# Patient Record
Sex: FEMALE | Race: WHITE | NOT HISPANIC OR LATINO | Employment: OTHER | URBAN - METROPOLITAN AREA
[De-identification: names, ages, dates, MRNs, and addresses within clinical notes are randomized per-mention and may not be internally consistent; named-entity substitution may affect disease eponyms.]

---

## 2018-11-05 ENCOUNTER — HISTORICAL (OUTPATIENT)
Dept: ADMINISTRATIVE | Facility: HOSPITAL | Age: 83
End: 2018-11-05

## 2018-11-07 LAB — FINAL CULTURE: NORMAL

## 2021-09-22 ENCOUNTER — HISTORICAL (OUTPATIENT)
Dept: ADMINISTRATIVE | Facility: HOSPITAL | Age: 86
End: 2021-09-22

## 2021-09-22 LAB
ABS NEUT (OLG): 3.15 X10(3)/MCL (ref 2.1–9.2)
ALBUMIN SERPL-MCNC: 3.8 GM/DL (ref 3.4–4.8)
ALBUMIN/GLOB SERPL: 1.5 RATIO (ref 1.1–2)
ALP SERPL-CCNC: 87 UNIT/L (ref 40–150)
ALT SERPL-CCNC: 10 UNIT/L (ref 0–55)
AST SERPL-CCNC: 22 UNIT/L (ref 5–34)
BASOPHILS # BLD AUTO: 0.1 X10(3)/MCL (ref 0–0.2)
BASOPHILS NFR BLD AUTO: 1 %
BILIRUB SERPL-MCNC: 0.9 MG/DL
BILIRUBIN DIRECT+TOT PNL SERPL-MCNC: 0.4 MG/DL (ref 0–0.5)
BILIRUBIN DIRECT+TOT PNL SERPL-MCNC: 0.5 MG/DL (ref 0–0.8)
BUN SERPL-MCNC: 23.2 MG/DL (ref 9.8–20.1)
CALCIUM SERPL-MCNC: 9.9 MG/DL (ref 8.4–10.2)
CHLORIDE SERPL-SCNC: 101 MMOL/L (ref 98–111)
CO2 SERPL-SCNC: 30 MMOL/L (ref 23–31)
CREAT SERPL-MCNC: 1.11 MG/DL (ref 0.55–1.02)
EOSINOPHIL # BLD AUTO: 0.2 X10(3)/MCL (ref 0–0.9)
EOSINOPHIL NFR BLD AUTO: 4 %
ERYTHROCYTE [DISTWIDTH] IN BLOOD BY AUTOMATED COUNT: 12.4 % (ref 11.5–17)
GLOBULIN SER-MCNC: 2.6 GM/DL (ref 2.4–3.5)
GLUCOSE SERPL-MCNC: 99 MG/DL (ref 75–121)
HCT VFR BLD AUTO: 45.1 % (ref 37–47)
HGB BLD-MCNC: 14.7 GM/DL (ref 12–16)
LYMPHOCYTES # BLD AUTO: 1.9 X10(3)/MCL (ref 0.6–4.6)
LYMPHOCYTES NFR BLD AUTO: 32 %
MCH RBC QN AUTO: 32.7 PG (ref 27–31)
MCHC RBC AUTO-ENTMCNC: 32.6 GM/DL (ref 33–36)
MCV RBC AUTO: 100.4 FL (ref 80–94)
MONOCYTES # BLD AUTO: 0.7 X10(3)/MCL (ref 0.1–1.3)
MONOCYTES NFR BLD AUTO: 11 %
NEUTROPHILS # BLD AUTO: 3.15 X10(3)/MCL (ref 2.1–9.2)
NEUTROPHILS NFR BLD AUTO: 52 %
PLATELET # BLD AUTO: 176 X10(3)/MCL (ref 130–400)
PMV BLD AUTO: 10 FL (ref 9.4–12.4)
POTASSIUM SERPL-SCNC: 4.1 MMOL/L (ref 3.5–5.1)
PROT SERPL-MCNC: 6.4 GM/DL (ref 5.8–7.6)
RBC # BLD AUTO: 4.49 X10(6)/MCL (ref 4.2–5.4)
SODIUM SERPL-SCNC: 141 MMOL/L (ref 132–146)
T4 FREE SERPL-MCNC: 1.11 NG/DL (ref 0.7–1.48)
TSH SERPL-ACNC: 1.24 UIU/ML (ref 0.35–4.94)
WBC # SPEC AUTO: 6.1 X10(3)/MCL (ref 4.5–11.5)

## 2022-03-09 ENCOUNTER — HISTORICAL (OUTPATIENT)
Dept: ADMINISTRATIVE | Facility: HOSPITAL | Age: 87
End: 2022-03-09

## 2022-03-09 LAB
ABS NEUT (OLG): 4.16 (ref 2.1–9.2)
ALBUMIN SERPL-MCNC: 3.8 G/DL (ref 3.4–4.8)
ALBUMIN/GLOB SERPL: 1.4 {RATIO} (ref 1.1–2)
ALP SERPL-CCNC: 87 U/L (ref 40–150)
ALT SERPL-CCNC: 14 U/L (ref 0–55)
AST SERPL-CCNC: 25 U/L (ref 5–34)
BASOPHILS # BLD AUTO: 0.1 10*3/UL (ref 0–0.2)
BASOPHILS NFR BLD AUTO: 1 %
BILIRUB SERPL-MCNC: 1 MG/DL
BILIRUBIN DIRECT+TOT PNL SERPL-MCNC: 0.4 (ref 0–0.5)
BILIRUBIN DIRECT+TOT PNL SERPL-MCNC: 0.6 (ref 0–0.8)
BUN SERPL-MCNC: 23.2 MG/DL (ref 9.8–20.1)
CALCIUM SERPL-MCNC: 9.5 MG/DL (ref 8.7–10.5)
CHLORIDE SERPL-SCNC: 102 MMOL/L (ref 98–111)
CHOLEST SERPL-MCNC: 177 MG/DL
CHOLEST/HDLC SERPL: 3 {RATIO} (ref 0–5)
CO2 SERPL-SCNC: 30 MMOL/L (ref 23–31)
CREAT SERPL-MCNC: 1.03 MG/DL (ref 0.55–1.02)
EOSINOPHIL # BLD AUTO: 0.1 10*3/UL (ref 0–0.9)
EOSINOPHIL NFR BLD AUTO: 2 %
ERYTHROCYTE [DISTWIDTH] IN BLOOD BY AUTOMATED COUNT: 12.7 % (ref 11.5–17)
GLOBULIN SER-MCNC: 2.8 G/DL (ref 2.4–3.5)
GLUCOSE SERPL-MCNC: 99 MG/DL (ref 75–121)
HCT VFR BLD AUTO: 45.6 % (ref 37–47)
HDLC SERPL-MCNC: 69 MG/DL (ref 35–60)
HEMOLYSIS INTERF INDEX SERPL-ACNC: 19
HGB BLD-MCNC: 14.8 G/DL (ref 12–16)
ICTERIC INTERF INDEX SERPL-ACNC: 1
LDLC SERPL CALC-MCNC: 87 MG/DL (ref 50–140)
LIPEMIC INTERF INDEX SERPL-ACNC: 5
LYMPHOCYTES # BLD AUTO: 2 10*3/UL (ref 0.6–4.6)
LYMPHOCYTES NFR BLD AUTO: 29 %
MANUAL DIFF? (OHS): NO
MCH RBC QN AUTO: 32.9 PG (ref 27–31)
MCHC RBC AUTO-ENTMCNC: 32.5 G/DL (ref 33–36)
MCV RBC AUTO: 101.3 FL (ref 80–94)
MONOCYTES # BLD AUTO: 0.6 10*3/UL (ref 0.1–1.3)
MONOCYTES NFR BLD AUTO: 9 %
NEUTROPHILS # BLD AUTO: 4.16 10*3/UL (ref 2.1–9.2)
NEUTROPHILS NFR BLD AUTO: 59 %
PLATELET # BLD AUTO: 194 10*3/UL (ref 130–400)
PMV BLD AUTO: 10.2 FL (ref 9.4–12.4)
POTASSIUM SERPL-SCNC: 4.8 MMOL/L (ref 3.5–5.1)
PROT SERPL-MCNC: 6.6 G/DL (ref 5.8–7.6)
RBC # BLD AUTO: 4.5 10*6/UL (ref 4.2–5.4)
SODIUM SERPL-SCNC: 142 MMOL/L (ref 132–146)
TRIGL SERPL-MCNC: 105 MG/DL (ref 37–140)
TSH SERPL-ACNC: 1.18 M[IU]/L (ref 0.35–4.94)
VLDLC SERPL CALC-MCNC: 21 MG/DL
WBC # SPEC AUTO: 7 10*3/UL (ref 4.5–11.5)

## 2022-04-11 ENCOUNTER — HISTORICAL (OUTPATIENT)
Dept: ADMINISTRATIVE | Facility: HOSPITAL | Age: 87
End: 2022-04-11

## 2022-04-28 VITALS
SYSTOLIC BLOOD PRESSURE: 126 MMHG | HEIGHT: 62 IN | BODY MASS INDEX: 33.51 KG/M2 | WEIGHT: 182.13 LBS | DIASTOLIC BLOOD PRESSURE: 82 MMHG

## 2022-06-18 ENCOUNTER — OFFICE VISIT (OUTPATIENT)
Dept: URGENT CARE | Facility: CLINIC | Age: 87
End: 2022-06-18
Payer: MEDICARE

## 2022-06-18 VITALS
HEART RATE: 88 BPM | WEIGHT: 159 LBS | BODY MASS INDEX: 29.26 KG/M2 | HEIGHT: 62 IN | DIASTOLIC BLOOD PRESSURE: 87 MMHG | RESPIRATION RATE: 18 BRPM | TEMPERATURE: 99 F | SYSTOLIC BLOOD PRESSURE: 124 MMHG | OXYGEN SATURATION: 99 %

## 2022-06-18 DIAGNOSIS — H10.32 ACUTE CONJUNCTIVITIS OF LEFT EYE, UNSPECIFIED ACUTE CONJUNCTIVITIS TYPE: Primary | ICD-10-CM

## 2022-06-18 PROCEDURE — 99213 PR OFFICE/OUTPT VISIT, EST, LEVL III, 20-29 MIN: ICD-10-PCS | Mod: ,,, | Performed by: FAMILY MEDICINE

## 2022-06-18 PROCEDURE — 99213 OFFICE O/P EST LOW 20 MIN: CPT | Mod: ,,, | Performed by: FAMILY MEDICINE

## 2022-06-18 RX ORDER — ENALAPRIL MALEATE 10 MG/1
10 TABLET ORAL DAILY
COMMUNITY
Start: 2022-04-05 | End: 2022-06-23 | Stop reason: SDUPTHER

## 2022-06-18 RX ORDER — LEVOTHYROXINE SODIUM 75 UG/1
75 TABLET ORAL DAILY
COMMUNITY
Start: 2022-04-12 | End: 2022-06-23 | Stop reason: SDUPTHER

## 2022-06-18 RX ORDER — MULTIVITAMIN/IRON/FOLIC ACID 18MG-0.4MG
TABLET ORAL
COMMUNITY
Start: 2022-06-01 | End: 2023-01-10 | Stop reason: SDUPTHER

## 2022-06-18 RX ORDER — APIXABAN 5 MG/1
5 TABLET, FILM COATED ORAL 2 TIMES DAILY
COMMUNITY
Start: 2022-04-20 | End: 2022-06-23 | Stop reason: SDUPTHER

## 2022-06-18 RX ORDER — ATORVASTATIN CALCIUM 20 MG/1
20 TABLET, FILM COATED ORAL DAILY
COMMUNITY
Start: 2022-04-05 | End: 2022-06-23 | Stop reason: SDUPTHER

## 2022-06-18 RX ORDER — FUROSEMIDE 40 MG/1
40 TABLET ORAL DAILY
COMMUNITY
Start: 2022-04-05 | End: 2022-06-23 | Stop reason: SDUPTHER

## 2022-06-18 RX ORDER — GENTAMICIN SULFATE 3 MG/ML
1 SOLUTION/ DROPS OPHTHALMIC EVERY 4 HOURS
Qty: 5 ML | Refills: 0 | Status: SHIPPED | OUTPATIENT
Start: 2022-06-18 | End: 2023-05-19 | Stop reason: ALTCHOICE

## 2022-06-18 NOTE — PROGRESS NOTES
"Subjective:       Patient ID: Shamika Pleitez is a 89 y.o. female.    Vitals:  height is 5' 2" (1.575 m) and weight is 72.1 kg (159 lb). Her oral temperature is 98.8 °F (37.1 °C). Her blood pressure is 124/87 and her pulse is 88. Her respiration is 18 and oxygen saturation is 99%.     Chief Complaint: Conjunctivitis (Left eye redness and pain x 5 days.)    Left eye redness and pain x 5 days.    Conjunctivitis  This is a new problem. The current episode started in the past 7 days. The problem occurs constantly. The problem has been unchanged. Pertinent negatives include no coughing. Exacerbated by: rubbing it. Treatments tried: clear eyes eye drops. The treatment provided no relief.       Eyes: Positive for eye discharge, eye itching and eye redness. Negative for vision loss, double vision, blurred vision and eyelid swelling.   Respiratory: Negative for cough.        Objective:      Physical Exam   HENT:   Ears:   Right Ear: Tympanic membrane normal.   Left Ear: Tympanic membrane normal.   Mouth/Throat: Mucous membranes are moist.   Eyes: Pupils are equal, round, and reactive to light.      Comments: Pos erythema L sclera   Cardiovascular: Normal rate.   Abdominal: Normal appearance.   Neurological: no focal deficit. She is alert.      Comments: PERRMARK LEE   Psychiatric: Her behavior is normal. Mood normal.         Assessment:       1. Acute conjunctivitis of left eye, unspecified acute conjunctivitis type          Plan:         Acute conjunctivitis of left eye, unspecified acute conjunctivitis type  -     gentamicin (GARAMYCIN) 0.3 % ophthalmic solution; Place 1 drop into the left eye every 4 (four) hours.  Dispense: 5 mL; Refill: 0                   "

## 2022-06-21 ENCOUNTER — OFFICE VISIT (OUTPATIENT)
Dept: PRIMARY CARE CLINIC | Facility: CLINIC | Age: 87
End: 2022-06-21
Payer: MEDICARE

## 2022-06-21 VITALS
HEART RATE: 71 BPM | SYSTOLIC BLOOD PRESSURE: 120 MMHG | DIASTOLIC BLOOD PRESSURE: 80 MMHG | WEIGHT: 160 LBS | RESPIRATION RATE: 18 BRPM | OXYGEN SATURATION: 98 % | BODY MASS INDEX: 29.26 KG/M2

## 2022-06-21 DIAGNOSIS — E03.9 HYPOTHYROIDISM, UNSPECIFIED TYPE: ICD-10-CM

## 2022-06-21 DIAGNOSIS — I10 PRIMARY HYPERTENSION: ICD-10-CM

## 2022-06-21 DIAGNOSIS — I48.0 PAROXYSMAL ATRIAL FIBRILLATION: ICD-10-CM

## 2022-06-21 DIAGNOSIS — H57.12 ACUTE PAIN IN LEFT EYE: Primary | ICD-10-CM

## 2022-06-21 DIAGNOSIS — E78.2 MIXED HYPERLIPIDEMIA: ICD-10-CM

## 2022-06-21 DIAGNOSIS — I51.89 DIASTOLIC DYSFUNCTION: ICD-10-CM

## 2022-06-21 PROCEDURE — 99213 OFFICE O/P EST LOW 20 MIN: CPT | Mod: ,,, | Performed by: STUDENT IN AN ORGANIZED HEALTH CARE EDUCATION/TRAINING PROGRAM

## 2022-06-21 PROCEDURE — 99213 PR OFFICE/OUTPT VISIT, EST, LEVL III, 20-29 MIN: ICD-10-PCS | Mod: ,,, | Performed by: STUDENT IN AN ORGANIZED HEALTH CARE EDUCATION/TRAINING PROGRAM

## 2022-06-23 PROBLEM — H91.90 HEARING LOSS: Status: ACTIVE | Noted: 2022-06-23

## 2022-06-23 PROBLEM — I77.9 PERIPHERAL ARTERIAL OCCLUSIVE DISEASE: Status: ACTIVE | Noted: 2022-06-23

## 2022-06-23 PROBLEM — I73.9 PERIPHERAL VASCULAR DISEASE: Status: ACTIVE | Noted: 2022-06-23

## 2022-06-23 PROBLEM — Z86.73 HISTORY OF CVA (CEREBROVASCULAR ACCIDENT): Status: ACTIVE | Noted: 2022-06-23

## 2022-06-23 PROBLEM — G64 DISORDER OF PERIPHERAL NERVOUS SYSTEM: Status: ACTIVE | Noted: 2022-06-23

## 2022-06-23 PROBLEM — E78.5 HYPERLIPIDEMIA: Status: ACTIVE | Noted: 2022-06-23

## 2022-06-23 PROBLEM — E03.9 HYPOTHYROIDISM: Status: ACTIVE | Noted: 2022-06-23

## 2022-06-23 PROBLEM — I77.9 DISORDER OF CAROTID ARTERY: Status: ACTIVE | Noted: 2022-06-23

## 2022-06-23 PROBLEM — I10 PRIMARY HYPERTENSION: Status: ACTIVE | Noted: 2022-06-23

## 2022-06-23 RX ORDER — LEVOTHYROXINE SODIUM 75 UG/1
75 TABLET ORAL DAILY
Qty: 90 TABLET | Refills: 3 | Status: SHIPPED | OUTPATIENT
Start: 2022-06-23 | End: 2023-01-10 | Stop reason: SDUPTHER

## 2022-06-23 RX ORDER — ENALAPRIL MALEATE 10 MG/1
10 TABLET ORAL DAILY
Qty: 90 TABLET | Refills: 3 | Status: SHIPPED | OUTPATIENT
Start: 2022-06-23 | End: 2023-01-10 | Stop reason: SDUPTHER

## 2022-06-23 RX ORDER — ATORVASTATIN CALCIUM 20 MG/1
20 TABLET, FILM COATED ORAL DAILY
Qty: 90 TABLET | Refills: 3 | Status: SHIPPED | OUTPATIENT
Start: 2022-06-23 | End: 2023-01-10 | Stop reason: SDUPTHER

## 2022-06-23 RX ORDER — APIXABAN 5 MG/1
5 TABLET, FILM COATED ORAL 2 TIMES DAILY
Qty: 180 TABLET | Refills: 3 | Status: SHIPPED | OUTPATIENT
Start: 2022-06-23 | End: 2023-01-10 | Stop reason: SDUPTHER

## 2022-06-23 RX ORDER — FUROSEMIDE 40 MG/1
40 TABLET ORAL DAILY
Qty: 90 TABLET | Refills: 3 | Status: SHIPPED | OUTPATIENT
Start: 2022-06-23 | End: 2023-01-09

## 2022-06-23 NOTE — PROGRESS NOTES
"  Subjective:       Patient ID: Shamika Pleitez is a 89 y.o. female.    Past Medical History:  CAD (coronary artery disease)  Diastolic dysfunction  Hearing loss  History of CVA (cerebrovascular accident)  Hypothyroidism  Paroxysmal atrial fibrillation  Primary hypertension     Chief Complaint: Follow-up (Clinic f/u s/p [facility] urgent care visit " left eye redness, discomfort , and teary" onset 1 week , not clearing up ) and Medication Refill    Presents to the clinic for follow up. Needs medication refills. Overall doing well. Complaining of L eye pain/watery discharge for 2 weeks. Was seen in urgent care overall 3 days ago, diagnosed with acute conjunctivitis. Given gentamicin eye drops for possible bacterial infection, but no improvement. Denies any trauma, purulent discharge, scratches/foreign body. States pain behind the eye, deep achy worsens with movement. Denies any blurry vision/vision changes. Tried clear eye tear drops without relief.     Specialist:   Cardiology - [facility]   Neurology - Dr. Cha   Cardiovascular Surgery - Dr. Moran    Orthopedist - Dr. Horton   ENT - Dr. Drummond     Review of Systems   Constitutional: Negative for chills, fatigue and fever.   HENT: Negative for nasal congestion, ear pain, rhinorrhea and sore throat.    Eyes: Positive for pain, discharge and redness. Negative for visual disturbance.   Respiratory: Negative for cough, shortness of breath and wheezing.    Cardiovascular: Negative for chest pain, palpitations and leg swelling.   Gastrointestinal: Negative for abdominal pain, diarrhea, nausea and vomiting.   Genitourinary: Negative for dysuria, frequency and nocturia.   Neurological: Negative for syncope, light-headedness and headaches.   Psychiatric/Behavioral: Negative for dysphoric mood. The patient is not nervous/anxious.          Objective:      Physical Exam  Constitutional:       General: She is not in acute distress.     Appearance: Normal appearance. She is not " ill-appearing.   Eyes:      General: No scleral icterus.        Right eye: No discharge.         Left eye: Discharge present.No hordeolum.      Extraocular Movements: Extraocular movements intact.      Right eye: Normal extraocular motion.      Left eye: No nystagmus.      Conjunctiva/sclera:      Right eye: Right conjunctiva is not injected. No exudate or hemorrhage.     Left eye: Left conjunctiva is injected. No exudate or hemorrhage.     Comments: Voiced pain behind the eye with movement of eyes which was normal   Cardiovascular:      Rate and Rhythm: Normal rate and regular rhythm.      Heart sounds: Normal heart sounds.   Pulmonary:      Effort: Pulmonary effort is normal. No respiratory distress.      Breath sounds: Normal breath sounds.   Abdominal:      General: There is no distension.      Palpations: Abdomen is soft.      Tenderness: There is no abdominal tenderness.   Musculoskeletal:      Right lower leg: No edema.      Left lower leg: No edema.   Skin:     General: Skin is warm.      Coloration: Skin is not jaundiced.      Findings: No erythema.   Neurological:      General: No focal deficit present.      Mental Status: She is alert. Mental status is at baseline.   Psychiatric:         Mood and Affect: Mood normal.         Behavior: Behavior normal.         Assessment & Plan:   1. Acute pain in left eye  Comments:  Unlikely bacterial conjunctivitis, no improvement with antibiotics.   Hurts with movement, no swelling/vision changes/purulent discharge, ED Precuations given   Urgent Referral to Ophthalmology, ?glaucoma    2. Mixed hyperlipidemia  Assessment & Plan:  Last Lipid Panel WNL  ASCVD calculated 10 year risk >7.5%  Continue Lipitor 20mg daily, resent prescription   Counseled on dietary modifications  Counseled to exercise 150 minutes weekly as exercise raises HDL and lowers LDL     3. Primary hypertension  Assessment & Plan:  Today's BP WNL  Continue Lasix, Enalapril   Counseled on low sodium  diet, exercise, tobacco avoidance, and limiting alcohol intake   Pt. Has home BP cuff, instructed to measure home BP and report abnormal values     4. Hypothyroidism, unspecified type  Assessment & Plan:  Stable, continue Levothyroxine, refilled   Last TSH WNL  Encouraged strict medication compliance (take in the AM on an empty stomach with a full glass of water, no food/drink or other medications for >30 minutes)     5. Paroxysmal atrial fibrillation  Assessment & Plan:  Non-valvular AF  [id number] score: >3  Continue Eliquis, refilled   Defer Management to Cardiology, keep scheduled appointment     6. Diastolic dysfunction  Assessment & Plan:  Stable   Defer to Cardiology   Appears Euvolemic today   Refilled Lasix     RTC in 5 months or sooner if needed

## 2022-06-24 NOTE — ASSESSMENT & PLAN NOTE
Today's BP WNL  Continue Lasix, Enalapril   Counseled on low sodium diet, exercise, tobacco avoidance, and limiting alcohol intake   Pt. Has home BP cuff, instructed to measure home BP and report abnormal values

## 2022-06-24 NOTE — ASSESSMENT & PLAN NOTE
Last Lipid Panel WNL  ASCVD calculated 10 year risk >7.5%  Continue Lipitor 20mg daily, resent prescription   Counseled on dietary modifications  Counseled to exercise 150 minutes weekly as exercise raises HDL and lowers LDL

## 2022-06-24 NOTE — ASSESSMENT & PLAN NOTE
Stable, continue Levothyroxine, refilled   Last TSH WNL  Encouraged strict medication compliance (take in the AM on an empty stomach with a full glass of water, no food/drink or other medications for >30 minutes)

## 2022-06-24 NOTE — ASSESSMENT & PLAN NOTE
Non-valvular AF  [id number] score: >3  Continue Eliquis, refilled   Defer Management to Cardiology, keep scheduled appointment

## 2022-06-25 ENCOUNTER — TELEPHONE (OUTPATIENT)
Dept: ADMINISTRATIVE | Facility: HOSPITAL | Age: 87
End: 2022-06-25
Payer: MEDICARE

## 2022-06-25 NOTE — TELEPHONE ENCOUNTER
Type:  Needs Medical Advice    Who Called:daughter  Symptoms (please be specific): sinus infection cough   How long has patient had these symptoms:  Couple days  Pharmacy name and phone #:  na  Would the patient rather a call back or a response via [facility]? Call back  Best Call Back Number: [phone]  Additional Information: pt tested positive for covid with at home test daughter is resting that pt have infusion please advise

## 2022-06-27 ENCOUNTER — TELEPHONE (OUTPATIENT)
Dept: PRIMARY CARE CLINIC | Facility: CLINIC | Age: 87
End: 2022-06-27
Payer: MEDICARE

## 2022-06-27 NOTE — TELEPHONE ENCOUNTER
Spoke to daughter, patient was diagnosed with COVID late last week. I am seeing the other daughter this morning, she will get the newest update on the patient and let me know. I will also review her current medications. Will determine/discuss possibility of Paxlovid.     Thanks,   Isabella Miller MD

## 2022-06-29 ENCOUNTER — PATIENT MESSAGE (OUTPATIENT)
Dept: PRIMARY CARE CLINIC | Facility: CLINIC | Age: 87
End: 2022-06-29
Payer: MEDICARE

## 2022-07-01 ENCOUNTER — TELEPHONE (OUTPATIENT)
Dept: PRIMARY CARE CLINIC | Facility: CLINIC | Age: 87
End: 2022-07-01
Payer: MEDICARE

## 2022-07-01 RX ORDER — BENZONATATE 100 MG/1
200 CAPSULE ORAL 3 TIMES DAILY PRN
Qty: 30 CAPSULE | Refills: 1 | Status: SHIPPED | OUTPATIENT
Start: 2022-07-01 | End: 2022-07-11

## 2022-07-01 RX ORDER — CETIRIZINE HYDROCHLORIDE 10 MG/1
10 TABLET ORAL DAILY
Qty: 30 TABLET | Refills: 0 | Status: SHIPPED | OUTPATIENT
Start: 2022-07-01 | End: 2022-11-08 | Stop reason: ALTCHOICE

## 2022-07-01 RX ORDER — HYDROXYZINE PAMOATE 25 MG/1
25 CAPSULE ORAL NIGHTLY PRN
Qty: 30 CAPSULE | Refills: 0 | Status: SHIPPED | OUTPATIENT
Start: 2022-07-01 | End: 2022-11-08 | Stop reason: ALTCHOICE

## 2022-07-01 RX ORDER — FLUTICASONE PROPIONATE 50 MCG
2 SPRAY, SUSPENSION (ML) NASAL 2 TIMES DAILY PRN
Qty: 16 G | Refills: 11 | Status: SHIPPED | OUTPATIENT
Start: 2022-07-01 | End: 2022-11-08 | Stop reason: ALTCHOICE

## 2022-07-01 NOTE — TELEPHONE ENCOUNTER
Spoke with patient dgt , patient tested pos last week for covid, still has a cough , sinus drip , and nasal congestion , denies any chest pain ,sob, HA, nor fever or chills , taking Robitussin for cough only. Please review and advise .

## 2022-11-02 ENCOUNTER — TELEPHONE (OUTPATIENT)
Dept: PRIMARY CARE CLINIC | Facility: CLINIC | Age: 87
End: 2022-11-02
Payer: MEDICARE

## 2022-11-02 DIAGNOSIS — E78.2 MIXED HYPERLIPIDEMIA: Chronic | ICD-10-CM

## 2022-11-02 DIAGNOSIS — R73.09 ELEVATED GLUCOSE LEVEL: ICD-10-CM

## 2022-11-02 DIAGNOSIS — Z00.00 WELLNESS EXAMINATION: ICD-10-CM

## 2022-11-02 DIAGNOSIS — I10 PRIMARY HYPERTENSION: Primary | Chronic | ICD-10-CM

## 2022-11-02 DIAGNOSIS — I25.10 CORONARY ARTERY DISEASE INVOLVING NATIVE HEART WITHOUT ANGINA PECTORIS, UNSPECIFIED VESSEL OR LESION TYPE: Chronic | ICD-10-CM

## 2022-11-02 DIAGNOSIS — E03.9 HYPOTHYROIDISM, UNSPECIFIED TYPE: Chronic | ICD-10-CM

## 2022-11-02 PROBLEM — Z86.73 HISTORY OF CVA (CEREBROVASCULAR ACCIDENT): Chronic | Status: ACTIVE | Noted: 2022-06-23

## 2022-11-02 PROBLEM — E78.5 HYPERLIPIDEMIA: Chronic | Status: ACTIVE | Noted: 2022-06-23

## 2022-11-02 NOTE — TELEPHONE ENCOUNTER
I have ordered the wellness labs. Please let the patient and family know.     Thanks,   Isabella Miller MD

## 2022-11-02 NOTE — TELEPHONE ENCOUNTER
----- Message from Dmitriy Barrios MA sent at 2022 11:51 AM CDT -----  Regardin ---wellness  Wellness appt     No active labs orders please review to verify if no labs needed.

## 2022-11-07 ENCOUNTER — LAB VISIT (OUTPATIENT)
Dept: LAB | Facility: HOSPITAL | Age: 87
End: 2022-11-07
Attending: STUDENT IN AN ORGANIZED HEALTH CARE EDUCATION/TRAINING PROGRAM
Payer: MEDICARE

## 2022-11-07 DIAGNOSIS — Z00.00 WELLNESS EXAMINATION: ICD-10-CM

## 2022-11-07 DIAGNOSIS — E03.9 HYPOTHYROIDISM, UNSPECIFIED TYPE: Chronic | ICD-10-CM

## 2022-11-07 DIAGNOSIS — E78.2 MIXED HYPERLIPIDEMIA: ICD-10-CM

## 2022-11-07 DIAGNOSIS — I25.10 CORONARY ARTERY DISEASE INVOLVING NATIVE HEART WITHOUT ANGINA PECTORIS, UNSPECIFIED VESSEL OR LESION TYPE: ICD-10-CM

## 2022-11-07 DIAGNOSIS — R73.09 ELEVATED GLUCOSE LEVEL: ICD-10-CM

## 2022-11-07 DIAGNOSIS — I10 PRIMARY HYPERTENSION: ICD-10-CM

## 2022-11-07 LAB
ALBUMIN SERPL-MCNC: 3.8 GM/DL (ref 3.4–4.8)
ALBUMIN/GLOB SERPL: 1.4 RATIO (ref 1.1–2)
ALP SERPL-CCNC: 90 UNIT/L (ref 40–150)
ALT SERPL-CCNC: 17 UNIT/L (ref 0–55)
APPEARANCE UR: CLEAR
AST SERPL-CCNC: 22 UNIT/L (ref 5–34)
BACTERIA #/AREA URNS AUTO: NORMAL /HPF
BASOPHILS # BLD AUTO: 0.07 X10(3)/MCL (ref 0–0.2)
BASOPHILS NFR BLD AUTO: 0.9 %
BILIRUB UR QL STRIP.AUTO: NEGATIVE MG/DL
BILIRUBIN DIRECT+TOT PNL SERPL-MCNC: 1.1 MG/DL
BUN SERPL-MCNC: 26.5 MG/DL (ref 9.8–20.1)
CALCIUM SERPL-MCNC: 10 MG/DL (ref 8.4–10.2)
CHLORIDE SERPL-SCNC: 99 MMOL/L (ref 98–111)
CHOLEST SERPL-MCNC: 155 MG/DL
CHOLEST/HDLC SERPL: 2 {RATIO} (ref 0–5)
CO2 SERPL-SCNC: 32 MMOL/L (ref 23–31)
COLOR UR AUTO: YELLOW
CREAT SERPL-MCNC: 1.17 MG/DL (ref 0.55–1.02)
EOSINOPHIL # BLD AUTO: 0.09 X10(3)/MCL (ref 0–0.9)
EOSINOPHIL NFR BLD AUTO: 1.2 %
ERYTHROCYTE [DISTWIDTH] IN BLOOD BY AUTOMATED COUNT: 13 % (ref 11.5–17)
EST. AVERAGE GLUCOSE BLD GHB EST-MCNC: 108.3 MG/DL
GFR SERPLBLD CREATININE-BSD FMLA CKD-EPI: 44 MLS/MIN/1.73/M2
GLOBULIN SER-MCNC: 2.7 GM/DL (ref 2.4–3.5)
GLUCOSE SERPL-MCNC: 105 MG/DL (ref 75–121)
GLUCOSE UR QL STRIP.AUTO: NEGATIVE MG/DL
HBA1C MFR BLD: 5.4 %
HCT VFR BLD AUTO: 50.9 % (ref 37–47)
HDLC SERPL-MCNC: 67 MG/DL (ref 35–60)
HGB BLD-MCNC: 16.3 GM/DL (ref 12–16)
IMM GRANULOCYTES # BLD AUTO: 0.03 X10(3)/MCL (ref 0–0.04)
IMM GRANULOCYTES NFR BLD AUTO: 0.4 %
KETONES UR QL STRIP.AUTO: NEGATIVE MG/DL
LDLC SERPL CALC-MCNC: 73 MG/DL (ref 50–140)
LEUKOCYTE ESTERASE UR QL STRIP.AUTO: NEGATIVE UNIT/L
LYMPHOCYTES # BLD AUTO: 2.33 X10(3)/MCL (ref 0.6–4.6)
LYMPHOCYTES NFR BLD AUTO: 30.4 %
MCH RBC QN AUTO: 32.3 PG (ref 27–31)
MCHC RBC AUTO-ENTMCNC: 32 MG/DL (ref 33–36)
MCV RBC AUTO: 101 FL (ref 80–94)
MONOCYTES # BLD AUTO: 0.62 X10(3)/MCL (ref 0.1–1.3)
MONOCYTES NFR BLD AUTO: 8.1 %
NEUTROPHILS # BLD AUTO: 4.5 X10(3)/MCL (ref 2.1–9.2)
NEUTROPHILS NFR BLD AUTO: 59 %
NITRITE UR QL STRIP.AUTO: NEGATIVE
NRBC BLD AUTO-RTO: 0 %
PH UR STRIP.AUTO: 7.5 [PH]
PLATELET # BLD AUTO: 202 X10(3)/MCL (ref 130–400)
PMV BLD AUTO: 9.7 FL (ref 7.4–10.4)
POTASSIUM SERPL-SCNC: 5.2 MMOL/L (ref 3.5–5.1)
PROT SERPL-MCNC: 6.5 GM/DL (ref 5.8–7.6)
PROT UR QL STRIP.AUTO: NEGATIVE MG/DL
RBC # BLD AUTO: 5.04 X10(6)/MCL (ref 4.2–5.4)
RBC #/AREA URNS AUTO: <5 /HPF
RBC UR QL AUTO: NEGATIVE UNIT/L
SODIUM SERPL-SCNC: 141 MMOL/L (ref 132–146)
SP GR UR STRIP.AUTO: 1.01 (ref 1–1.03)
SQUAMOUS #/AREA URNS AUTO: <5 /HPF
TRIGL SERPL-MCNC: 76 MG/DL (ref 37–140)
TSH SERPL-ACNC: 1.82 UIU/ML (ref 0.35–4.94)
UROBILINOGEN UR STRIP-ACNC: 0.2 MG/DL
VLDLC SERPL CALC-MCNC: 15 MG/DL
WBC # SPEC AUTO: 7.7 X10(3)/MCL (ref 4.5–11.5)
WBC #/AREA URNS AUTO: <5 /HPF

## 2022-11-07 PROCEDURE — 81001 URINALYSIS AUTO W/SCOPE: CPT

## 2022-11-07 PROCEDURE — 80061 LIPID PANEL: CPT

## 2022-11-07 PROCEDURE — 85025 COMPLETE CBC W/AUTO DIFF WBC: CPT

## 2022-11-07 PROCEDURE — 80053 COMPREHEN METABOLIC PANEL: CPT

## 2022-11-07 PROCEDURE — 36415 COLL VENOUS BLD VENIPUNCTURE: CPT

## 2022-11-07 PROCEDURE — 84443 ASSAY THYROID STIM HORMONE: CPT

## 2022-11-07 PROCEDURE — 83036 HEMOGLOBIN GLYCOSYLATED A1C: CPT

## 2022-11-08 ENCOUNTER — OFFICE VISIT (OUTPATIENT)
Dept: PRIMARY CARE CLINIC | Facility: CLINIC | Age: 87
End: 2022-11-08
Payer: MEDICARE

## 2022-11-08 VITALS
RESPIRATION RATE: 20 BRPM | TEMPERATURE: 97 F | OXYGEN SATURATION: 98 % | HEART RATE: 92 BPM | HEIGHT: 62 IN | SYSTOLIC BLOOD PRESSURE: 120 MMHG | WEIGHT: 162 LBS | BODY MASS INDEX: 29.81 KG/M2 | DIASTOLIC BLOOD PRESSURE: 80 MMHG

## 2022-11-08 DIAGNOSIS — I10 PRIMARY HYPERTENSION: Chronic | ICD-10-CM

## 2022-11-08 DIAGNOSIS — R79.89 ELEVATED SERUM CREATININE: ICD-10-CM

## 2022-11-08 DIAGNOSIS — Z00.00 WELLNESS EXAMINATION: Primary | ICD-10-CM

## 2022-11-08 DIAGNOSIS — E87.5 HYPERKALEMIA: ICD-10-CM

## 2022-11-08 DIAGNOSIS — D75.1 ERYTHROCYTOSIS: ICD-10-CM

## 2022-11-08 DIAGNOSIS — Z23 FLU VACCINE NEED: ICD-10-CM

## 2022-11-08 DIAGNOSIS — I87.2 CHRONIC VENOUS INSUFFICIENCY OF LOWER EXTREMITY: Chronic | ICD-10-CM

## 2022-11-08 PROCEDURE — G0008 ADMIN INFLUENZA VIRUS VAC: HCPCS | Mod: ,,, | Performed by: STUDENT IN AN ORGANIZED HEALTH CARE EDUCATION/TRAINING PROGRAM

## 2022-11-08 PROCEDURE — 90694 FLU VACCINE - QUADRIVALENT - ADJUVANTED: ICD-10-PCS | Mod: ,,, | Performed by: STUDENT IN AN ORGANIZED HEALTH CARE EDUCATION/TRAINING PROGRAM

## 2022-11-08 PROCEDURE — G0439 PPPS, SUBSEQ VISIT: HCPCS | Mod: ,,, | Performed by: STUDENT IN AN ORGANIZED HEALTH CARE EDUCATION/TRAINING PROGRAM

## 2022-11-08 PROCEDURE — G0008 FLU VACCINE - QUADRIVALENT - ADJUVANTED: ICD-10-PCS | Mod: ,,, | Performed by: STUDENT IN AN ORGANIZED HEALTH CARE EDUCATION/TRAINING PROGRAM

## 2022-11-08 PROCEDURE — G0439 PR MEDICARE ANNUAL WELLNESS SUBSEQUENT VISIT: ICD-10-PCS | Mod: ,,, | Performed by: STUDENT IN AN ORGANIZED HEALTH CARE EDUCATION/TRAINING PROGRAM

## 2022-11-08 PROCEDURE — 90694 VACC AIIV4 NO PRSRV 0.5ML IM: CPT | Mod: ,,, | Performed by: STUDENT IN AN ORGANIZED HEALTH CARE EDUCATION/TRAINING PROGRAM

## 2022-11-08 RX ORDER — POTASSIUM CHLORIDE 750 MG/1
10 TABLET, EXTENDED RELEASE ORAL 2 TIMES DAILY
COMMUNITY
Start: 2022-11-08 | End: 2023-01-09 | Stop reason: ALTCHOICE

## 2022-11-08 NOTE — ASSESSMENT & PLAN NOTE
Edema noted   Recommended restarting compression boots for 1hr per day   Wear compression stockings as tolerated, avoid prolonged sitting/standing, elevate legs as much as possible   Lasix daily   Encouraged low sodium intake, good PO hydration

## 2022-11-08 NOTE — PROGRESS NOTES
Medical Annual Wellness Visit     Patient ID: [id number]   Chief Complaint: Medicare AWV    HPI:     Shamika Pleitez is a 89 y.o. female here today for a Medicare Wellness.  No acute complaints today. Accompanied by her two daughters, who are very active/supportive in her daily life. Needs the flu vaccine today. Plan to get 2nd booster shot for COVID soon. Unsure if she is due for a booster with her PNA vaccine or if she ever received the Shingrix 2 dose series for Shingles, daughter will look at home where she has all of the patient's immunization record and let us know. Saw cardiology this morning, no changes to her medication. Reviewed labs with patient/daughters, answered all questions/concerns at this time. Labs showed elevated hemoglobin/hematocrit, stable macrocytosis, elevated MCH/MCHC, elevated BUN/Creatinine, CO2 and potassium levels. Admits to not drinking lots of fluids/water during the day. Takes lasix daily along with potassium supplement twice a day.     Opioid Screening: Patient medication list reviewed, patient is not taking prescription opioids. Patient is not using additional opioids than prescribed. Patient is at low risk of substance abuse based on this opioid use history.     Past Medical History:   CAD (coronary artery disease)  Diastolic dysfunction  Hearing loss  History of CVA (cerebrovascular accident)  Hypothyroidism  Paroxysmal atrial fibrillation  Primary hypertension     Past Surgical History:   Procedure Laterality Date    EXCISIONAL HEMORRHOIDECTOMY      EYE SURGERY  1990s    cataract    HYSTERECTOMY      KNEE SURGERY Right     right knee replacement    left cea      left shoulder repair      right cea       Review of patient's allergies indicates:   Allergen Reactions    Sulfacetamide      Outpatient Medications Marked as Taking for the 11/8/22 encounter (Office Visit) with Isabella Miller MD   Medication Sig Dispense Refill    atorvastatin (LIPITOR) 20 MG tablet Take 1 tablet  (20 mg total) by mouth once daily. 90 tablet 3    ELIQUIS 5 mg Tab Take 1 tablet (5 mg total) by mouth 2 (two) times daily. 180 tablet 3    enalapril (VASOTEC) 10 MG tablet Take 1 tablet (10 mg total) by mouth once daily. 90 tablet 3    furosemide (LASIX) 40 MG tablet Take 1 tablet (40 mg total) by mouth once daily. 90 tablet 3    gentamicin (GARAMYCIN) 0.3 % ophthalmic solution Place 1 drop into the left eye every 4 (four) hours. 5 mL 0    levothyroxine (SYNTHROID) 75 MCG tablet Take 1 tablet (75 mcg total) by mouth once daily. 90 tablet 3    multivitamin-iron-folic acid (CENTRUM COMPLETE) Tab       potassium chloride (KLOR-CON) 10 MEQ TbSR Take 10 mEq by mouth 2 (two) times daily.       Social History     Socioeconomic History    Marital status:    Tobacco Use    Smoking status: Never    Smokeless tobacco: Never   Substance and Sexual Activity    Alcohol use: Never    Drug use: Never    Sexual activity: Not Currently     Family History   Problem Relation Age of Onset    Multiple sclerosis Sister       Patient Care Team:  Isabella Miller MD as PCP - General (Internal Medicine)  Torey Lowry MD as Consulting Physician (Cardiovascular Disease)     Subjective:     Review of Systems   Constitutional:  Negative for chills, fever and malaise/fatigue.   HENT:  Positive for hearing loss.    Respiratory:  Negative for cough, shortness of breath and wheezing.    Cardiovascular:  Positive for leg swelling. Negative for chest pain and palpitations.   Gastrointestinal:  Negative for abdominal pain, diarrhea, nausea and vomiting.   Genitourinary:  Negative for dysuria, frequency and hematuria.   Neurological:  Negative for dizziness, weakness and headaches.   Psychiatric/Behavioral:  Negative for depression. The patient is not nervous/anxious.      Patient Reported Health Risk Assessment  What is your age?: 80 or older  Are you male or female?: Female  During the past four weeks, how much have you been  bothered by emotional problems such as feeling anxious, depressed, irritable, sad, or downhearted and blue?: Not at all  During the past five weeks, has your physical and/or emotional health limited your social activities with family, friends, neighbors, or groups?: Not at all  During the past four weeks, how much bodily pain have you generally had?: No pain  During the past four weeks, was someone available to help if you needed and wanted help?: Yes, as much as I wanted  During the past four weeks, what was the hardest physical activity you could do for at least two minutes?: Very light  Can you get to places out of walking distance without help?  (For example, can you travel alone on buses or taxis, or drive your own car?): No  Can you go shopping for groceries or clothes without someone's help?: Yes  Can you prepare your own meals?: Yes  Can you do your own housework without help?: Yes  Because of any health problems, do you need the help of another person with your personal care needs such as eating, bathing, dressing, or getting around the house?: No  Can you handle your own money without help?: No  During the past four weeks, how would you rate your health in general?: Very good  How have things been going for you during the past four weeks?: Good and bad parts about equal  Are you having difficulties driving your car?: Not applicable, I do not use a car  Do you always fasten your seat belt when you are in a car?: Yes, usually  How often in the past four weeks have you been bothered by falling or dizzy when standing up?: Never  How often in the past four weeks have you been bothered by sexual problems?: Never  How often in the past four weeks have you been bothered by trouble eating well?: Never  How often in the past four weeks have you been bothered by teeth or denture problems?: Never  How often in the past four weeks have you been bothered with problems using the telephone?: Never  How often in the past  "four weeks have you been bothered by tiredness or fatigue?: Sometimes  Have you fallen two or more times in the past year?: No  Are you afraid of falling?: No  Are you a smoker?: No  During the past four weeks, how many drinks of wine, beer, or other alcoholic beverages did you have?: No alcohol at all  Do you exercise for about 20 minutes three or more days a week?: No, I usually do not exercise this much  Have you been given any information to help you with hazards in your house that might hurt you?: Yes  Have you been given any information to help you with keeping track of your medications?: Yes  How often do you have trouble taking medicines the way you've been told to take them?: I always take them as prescribed  How confident are you that you can control and manage most of your health problems?: Very confident  What is your race? (Check all that apply.):     Objective:     /80 Comment: report from cardio  Pulse 92   Temp 97.3 °F (36.3 °C)   Resp 20   Ht 5' 2" (1.575 m)   Wt 73.5 kg (162 lb)   SpO2 98%   BMI 29.63 kg/m²     Physical Exam  Vitals and nursing note reviewed.   Constitutional:       General: She is not in acute distress.     Appearance: Normal appearance. She is not ill-appearing.   Eyes:      General: No scleral icterus.     Extraocular Movements: Extraocular movements intact.      Conjunctiva/sclera: Conjunctivae normal.      Pupils: Pupils are equal, round, and reactive to light.   Cardiovascular:      Rate and Rhythm: Normal rate and regular rhythm.      Pulses: Normal pulses.      Heart sounds: Normal heart sounds. No murmur heard.  Pulmonary:      Effort: Pulmonary effort is normal. No respiratory distress.      Breath sounds: Normal breath sounds. No wheezing.   Abdominal:      General: There is no distension.      Palpations: Abdomen is soft.      Tenderness: There is no abdominal tenderness.   Musculoskeletal:      Right lower leg: Edema present.      Left lower leg: " Edema present.   Skin:     General: Skin is warm.      Coloration: Skin is not jaundiced.   Neurological:      Mental Status: She is alert. Mental status is at baseline.      Gait: Gait normal.   Psychiatric:         Mood and Affect: Mood normal.         Behavior: Behavior normal.     Fall Risk Assessment - Outpatient 11/8/2022 6/21/2022 6/18/2022   Mobility Status Ambulatory Ambulatory w/ assistance Ambulatory w/ assistance   Number of falls 0 0 0   Identified as fall risk 0 0 0     Depression Screening  Over the past two weeks, has the patient felt down, depressed, or hopeless?: No  Over the past two weeks, has the patient felt little interest or pleasure in doing things?: No  Functional Ability/Safety Screening  Was the patient's timed Up & Go test unsteady or longer than 30 seconds?: No  Does the patient need help with phone, transportation, shopping, preparing meals, housework, laundry, meds, or managing money?: No  Does the patient's home have rugs in the hallway, lack grab bars in the bathroom, lack handrails on the stairs or have poor lighting?: No  Have you noticed any hearing difficulties?: Yes  Cognitive Function (Assessed through direct observation with due consideration of information obtained by way of patient reports and/or concerns raised by family, friends, caretakers, or others)    Does the patient repeat questions/statements in the same day?: No  Does the patient have trouble remembering the date, year, and time?: No  Does the patient have difficulty managing finances?: No  Does the patient have a decreased sense of direction?: No  Assessment/Plan:     1. Wellness examination   Routine Health Maintenance   Exercise as tolerated, >30 minutes a day for 3-5 days a week.  Counseled patient on compliance with medications and healthy diet.     Age-Appropriate Screening  Vaccinations:    - Flu: Received today    - Pneumonia: Daughter will check immunization record at home, if due for booster, recommended  PNA 20 vaccine   - Shingles (>50): Daughter will check immunization record at home, per records never has received the 2 dose series Shingrix, recommended going to pharmacy to complete   - Tdap: Daughter will check immunization record at home, if due for booster, recommended at pharmacy   - COVID: 2 dose series, 1 booster, plan to get second booster soon     Screening:   - Pap Smear (21-65): N/A, aged out    - Mammogram (40-50 discuss w/ pt, all >50): N/A, aged out    - Osteoporosis (all>65, sooner if risk factors): Discuss with family    - Lung Ca. Screening (50-80 if >20 pack yrs current or quit <15 yrs): N/A, never a smoker    - Colon Ca. Screening (age >45): N/A, aged out     2. Primary hypertension  Assessment & Plan:  Today's BP WNL  Continue Lasix, Enalapril   Counseled on low sodium diet, exercise, tobacco avoidance, and limiting alcohol intake   Pt. Has home BP cuff, instructed to measure home BP and report abnormal values     3. Flu vaccine need  -     Influenza (FLUAD) - Quadrivalent (Adjuvanted) *Preferred* (65+) (PF)    4. Chronic venous insufficiency of lower extremity  Assessment & Plan:  Edema noted   Recommended restarting compression boots for 1hr per day   Wear compression stockings as tolerated, avoid prolonged sitting/standing, elevate legs as much as possible   Lasix daily   Encouraged low sodium intake, good PO hydration     5. Erythrocytosis  Comments:  Mildly elevated H&H, suspect dehyration (hemoconcentration)   Repeat CBC in 1 month if remains elevated then will initiate further workup  Orders:  -     CBC Auto Differential; Future; Expected date: 11/28/2022    6. Hyperkalemia  Comments:  Slightly elevated at 5.2, multifactorial (dehydration and potassium supplements)   Decrease potassium supplement to once a day, repeat BMP   Orders:  -     Basic Metabolic Panel; Future; Expected date: 11/28/2022    7. Elevated serum creatinine  Comments:  Repeat BMP   Increase PO hydration  Avoid  nephrotoxic agents  Orders:  -     Basic Metabolic Panel; Future; Expected date: 11/28/2022     Medicare Annual Wellness and Personalized Prevention Plan:   Fall Risk + Home Safety + Hearing Impairment + Depression Screen + Opioid and Substance Abuse Screening + Cognitive Impairment Screen + Health Risk Assessment all reviewed.     Advance Care Planning   I attest to discussing Advance Care Planning with patient and/or family member. Daughter states that they have power of  and living will at home, will bring copies of both for the medical record next time.   Education was provided including the importance of the Health Care Power of , Advance Directives, and/or [place] documentation.  The patient expressed understanding to the importance of this information and discussion.  Time spent:      Follow up in about 6 months (around 5/8/2023) for Medical Management. In addition to their scheduled follow up, the patient has also been instructed to follow up on as needed basis.

## 2022-11-17 ENCOUNTER — OFFICE VISIT (OUTPATIENT)
Dept: URGENT CARE | Facility: CLINIC | Age: 87
End: 2022-11-17
Payer: MEDICARE

## 2022-11-17 VITALS
TEMPERATURE: 99 F | DIASTOLIC BLOOD PRESSURE: 72 MMHG | HEART RATE: 99 BPM | SYSTOLIC BLOOD PRESSURE: 108 MMHG | HEIGHT: 62 IN | WEIGHT: 162 LBS | BODY MASS INDEX: 29.81 KG/M2 | RESPIRATION RATE: 20 BRPM | OXYGEN SATURATION: 99 %

## 2022-11-17 DIAGNOSIS — J10.1 INFLUENZA A: ICD-10-CM

## 2022-11-17 DIAGNOSIS — J20.9 ACUTE BRONCHITIS, UNSPECIFIED ORGANISM: Primary | ICD-10-CM

## 2022-11-17 DIAGNOSIS — R05.9 COUGH, UNSPECIFIED TYPE: ICD-10-CM

## 2022-11-17 LAB
CTP QC/QA: YES
POC MOLECULAR INFLUENZA A AGN: POSITIVE
POC MOLECULAR INFLUENZA B AGN: NEGATIVE

## 2022-11-17 PROCEDURE — 99213 PR OFFICE/OUTPT VISIT, EST, LEVL III, 20-29 MIN: ICD-10-PCS | Mod: ,,, | Performed by: FAMILY MEDICINE

## 2022-11-17 PROCEDURE — 99213 OFFICE O/P EST LOW 20 MIN: CPT | Mod: ,,, | Performed by: FAMILY MEDICINE

## 2022-11-17 PROCEDURE — 87502 POCT INFLUENZA A/B MOLECULAR: ICD-10-PCS | Mod: QW,,, | Performed by: FAMILY MEDICINE

## 2022-11-17 PROCEDURE — 87502 INFLUENZA DNA AMP PROBE: CPT | Mod: QW,,, | Performed by: FAMILY MEDICINE

## 2022-11-17 RX ORDER — AMOXICILLIN 875 MG/1
875 TABLET, FILM COATED ORAL EVERY 12 HOURS
Qty: 10 TABLET | Refills: 0 | Status: SHIPPED | OUTPATIENT
Start: 2022-11-17 | End: 2022-11-22

## 2022-11-17 RX ORDER — BENZONATATE 200 MG/1
200 CAPSULE ORAL 3 TIMES DAILY PRN
Qty: 15 CAPSULE | Refills: 0 | Status: SHIPPED | OUTPATIENT
Start: 2022-11-17 | End: 2022-11-22

## 2022-11-17 NOTE — PROGRESS NOTES
"Subjective:       Patient ID: Shamika Pleitez is a 89 y.o. female.    Vitals:  height is 5' 2" (1.575 m) and weight is 73.5 kg (162 lb). Her temperature is 98.7 °F (37.1 °C). Her blood pressure is 108/72 and her pulse is 99. Her respiration is 20 and oxygen saturation is 99%.     Chief Complaint: Cough (Sunday cough, chest congestion. Tried taking mucinex/ warm tea/ otc cough suppressant. At home covid test negative.)    HPI:  [age] year old female present to clinic with concerns of coughing, chest congestion since 7 days.  Received flu shot 2 days prior to the symptoms.  Over-the-counter medications as listed above some help.  Home COVID-19 test negative.  No fever in the clinic.  O2 sats 99%.    ROS  :  Constitutional : _No fatigue, No Fever  HEENT : _No sore throat, no ear pain, + sinus congestion  Neck : No pain, range of motion present  Respiratory : _Coughing, mucus production  Cardiovascular : _No chest pain, no palpitations  Gastrointestinal : _No vomiting or diarrhea. No abdominal pain  Integumentary : _No skin rash     Objective:      Physical Exam    General : Alert and Oriented, No apparent distress, afebrile, appears comfortable sitting in exam chair, clear speech and appropriate communication.  Accompanied by daughter today  Neck - supple  HENT : Oropharynx mild redness, no swelling no exudate  Respiratory : Bilateral equal breath sounds, nonlabored respirations  Cardiovascular : Rate, rhythm regular, normal volume pulse, no murmur  Gastrointestinal: Full abdomen, soft, nontender to palpate  Integumentary : Warm, Dry and no rash    Assessment:       1. Acute bronchitis, unspecified organism    2. Cough, unspecified type    3. Influenza A            Plan:     Cool mist vaporizer and cough drop to keep there was moist.  Claritin 10 mg for nasal congestion.  Medications as directed.  Considering the duration of symptoms and worsening symptoms antibiotics as prescribed.  Patient and daughter voiced " understanding  Positive for influenza a today, discussed the condition and course, late for antiviral medications.  Symptomatic treatment for now  Tylenol for fever body aches and headache.  Call this clinic for any questions.  ER precautions    Acute bronchitis, unspecified organism  -     benzonatate (TESSALON) 200 MG capsule; Take 1 capsule (200 mg total) by mouth 3 (three) times daily as needed for Cough.  Dispense: 15 capsule; Refill: 0  -     amoxicillin (AMOXIL) 875 MG tablet; Take 1 tablet (875 mg total) by mouth every 12 (twelve) hours. for 5 days  Dispense: 10 tablet; Refill: 0    Cough, unspecified type  -     POCT Influenza A/B Molecular    Influenza A

## 2022-11-17 NOTE — PATIENT INSTRUCTIONS
Cool mist vaporizer and cough drop to keep there was moist.  Claritin 10 mg for nasal congestion.  Medications as directed.  Considering the duration of symptoms and worsening symptoms antibiotics as prescribed.  Patient and daughter voiced understanding  Positive for influenza a today, discussed the condition and course, late for antiviral medications.  Symptomatic treatment for now  Tylenol for fever body aches and headache.  Call this clinic for any questions.  ER precautions

## 2023-01-05 ENCOUNTER — LAB VISIT (OUTPATIENT)
Dept: LAB | Facility: HOSPITAL | Age: 88
End: 2023-01-05
Attending: STUDENT IN AN ORGANIZED HEALTH CARE EDUCATION/TRAINING PROGRAM
Payer: MEDICARE

## 2023-01-05 ENCOUNTER — OFFICE VISIT (OUTPATIENT)
Dept: PRIMARY CARE CLINIC | Facility: CLINIC | Age: 88
End: 2023-01-05
Payer: MEDICARE

## 2023-01-05 VITALS
HEART RATE: 104 BPM | WEIGHT: 166 LBS | TEMPERATURE: 98 F | DIASTOLIC BLOOD PRESSURE: 79 MMHG | HEIGHT: 62 IN | RESPIRATION RATE: 20 BRPM | SYSTOLIC BLOOD PRESSURE: 120 MMHG | BODY MASS INDEX: 30.55 KG/M2

## 2023-01-05 DIAGNOSIS — I48.0 PAROXYSMAL ATRIAL FIBRILLATION: ICD-10-CM
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
## 2023-01-05 DIAGNOSIS — E87.5 HYPERKALEMIA: ICD-10-CM

## 2023-01-05 DIAGNOSIS — Z11.1 PPD SCREENING TEST: Primary | ICD-10-CM

## 2023-01-05 DIAGNOSIS — D75.1 ERYTHROCYTOSIS: ICD-10-CM

## 2023-01-05 DIAGNOSIS — Z02.2 ENCOUNTER FOR EXAMINATION FOR ADMISSION TO NURSING HOME: Primary | ICD-10-CM

## 2023-01-05 DIAGNOSIS — Z23 NEED FOR TETANUS, DIPHTHERIA, AND ACELLULAR PERTUSSIS (TDAP) VACCINE: ICD-10-CM

## 2023-01-05 DIAGNOSIS — R79.89 ELEVATED SERUM CREATININE: ICD-10-CM

## 2023-01-05 DIAGNOSIS — S50.811A ABRASION OF RIGHT FOREARM, INITIAL ENCOUNTER: ICD-10-CM

## 2023-01-05 DIAGNOSIS — I51.89 DIASTOLIC DYSFUNCTION: ICD-10-CM

## 2023-01-05 DIAGNOSIS — S60.519A: ICD-10-CM

## 2023-01-05 DIAGNOSIS — Z23 NEED FOR STREPTOCOCCUS PNEUMONIAE VACCINATION: ICD-10-CM

## 2023-01-05 DIAGNOSIS — I10 PRIMARY HYPERTENSION: Chronic | ICD-10-CM

## 2023-01-05 DIAGNOSIS — E83.52 HYPERCALCEMIA: ICD-10-CM

## 2023-01-05 DIAGNOSIS — Z23 NEED FOR PNEUMOCOCCAL VACCINE: ICD-10-CM

## 2023-01-05 LAB
ANION GAP SERPL CALC-SCNC: 10 MEQ/L
BASOPHILS # BLD AUTO: 0.09 X10(3)/MCL (ref 0–0.2)
BASOPHILS NFR BLD AUTO: 1.1 %
BUN SERPL-MCNC: 27.3 MG/DL (ref 9.8–20.1)
CALCIUM SERPL-MCNC: 10.3 MG/DL (ref 8.4–10.2)
CHLORIDE SERPL-SCNC: 104 MMOL/L (ref 98–111)
CO2 SERPL-SCNC: 31 MMOL/L (ref 23–31)
CREAT SERPL-MCNC: 1.22 MG/DL (ref 0.55–1.02)
CREAT/UREA NIT SERPL: 22
EOSINOPHIL # BLD AUTO: 0.08 X10(3)/MCL (ref 0–0.9)
EOSINOPHIL NFR BLD AUTO: 1 %
ERYTHROCYTE [DISTWIDTH] IN BLOOD BY AUTOMATED COUNT: 14.1 % (ref 11–14.5)
GFR SERPLBLD CREATININE-BSD FMLA CKD-EPI: 42 MLS/MIN/1.73/M2
GLUCOSE SERPL-MCNC: 100 MG/DL (ref 75–121)
HCT VFR BLD AUTO: 49.7 % (ref 37–47)
HGB BLD-MCNC: 15.8 GM/DL (ref 12–16)
IMM GRANULOCYTES # BLD AUTO: 0.02 X10(3)/MCL (ref 0–0.04)
IMM GRANULOCYTES NFR BLD AUTO: 0.2 %
LYMPHOCYTES # BLD AUTO: 2.61 X10(3)/MCL (ref 0.6–4.6)
LYMPHOCYTES NFR BLD AUTO: 31.6 %
MCH RBC QN AUTO: 32.2 PG
MCHC RBC AUTO-ENTMCNC: 31.8 MG/DL (ref 33–36)
MCV RBC AUTO: 101.2 FL (ref 80–94)
MONOCYTES # BLD AUTO: 0.66 X10(3)/MCL (ref 0.1–1.3)
MONOCYTES NFR BLD AUTO: 8 %
NEUTROPHILS # BLD AUTO: 4.79 X10(3)/MCL (ref 2.1–9.2)
NEUTROPHILS NFR BLD AUTO: 58.1 %
NRBC BLD AUTO-RTO: 0 % (ref 0–1)
PLATELET # BLD AUTO: 210 X10(3)/MCL (ref 140–371)
PMV BLD AUTO: 9.5 FL (ref 9.4–12.4)
POTASSIUM SERPL-SCNC: 5 MMOL/L (ref 3.5–5.1)
RBC # BLD AUTO: 4.91 X10(6)/MCL (ref 4.2–5.4)
SODIUM SERPL-SCNC: 145 MMOL/L (ref 132–146)
WBC # SPEC AUTO: 8.3 X10(3)/MCL (ref 4.5–11.5)

## 2023-01-05 PROCEDURE — 90472 TDAP VACCINE GREATER THAN OR EQUAL TO 7YO IM: ICD-10-PCS | Mod: ,,, | Performed by: STUDENT IN AN ORGANIZED HEALTH CARE EDUCATION/TRAINING PROGRAM

## 2023-01-05 PROCEDURE — 90677 PNEUMOCOCCAL CONJUGATE VACCINE 20-VALENT: ICD-10-PCS | Mod: ,,, | Performed by: STUDENT IN AN ORGANIZED HEALTH CARE EDUCATION/TRAINING PROGRAM

## 2023-01-05 PROCEDURE — 85025 COMPLETE CBC W/AUTO DIFF WBC: CPT

## 2023-01-05 PROCEDURE — 36415 COLL VENOUS BLD VENIPUNCTURE: CPT

## 2023-01-05 PROCEDURE — 90677 PCV20 VACCINE IM: CPT | Mod: ,,, | Performed by: STUDENT IN AN ORGANIZED HEALTH CARE EDUCATION/TRAINING PROGRAM

## 2023-01-05 PROCEDURE — 99214 OFFICE O/P EST MOD 30 MIN: CPT | Mod: 25,,, | Performed by: STUDENT IN AN ORGANIZED HEALTH CARE EDUCATION/TRAINING PROGRAM

## 2023-01-05 PROCEDURE — 90715 TDAP VACCINE GREATER THAN OR EQUAL TO 7YO IM: ICD-10-PCS | Mod: AT,,, | Performed by: STUDENT IN AN ORGANIZED HEALTH CARE EDUCATION/TRAINING PROGRAM

## 2023-01-05 PROCEDURE — G0009 PNEUMOCOCCAL CONJUGATE VACCINE 20-VALENT: ICD-10-PCS | Mod: ,,, | Performed by: STUDENT IN AN ORGANIZED HEALTH CARE EDUCATION/TRAINING PROGRAM

## 2023-01-05 PROCEDURE — 80048 BASIC METABOLIC PNL TOTAL CA: CPT

## 2023-01-05 PROCEDURE — G0009 ADMIN PNEUMOCOCCAL VACCINE: HCPCS | Mod: ,,, | Performed by: STUDENT IN AN ORGANIZED HEALTH CARE EDUCATION/TRAINING PROGRAM

## 2023-01-05 PROCEDURE — 99214 PR OFFICE/OUTPT VISIT, EST, LEVL IV, 30-39 MIN: ICD-10-PCS | Mod: 25,,, | Performed by: STUDENT IN AN ORGANIZED HEALTH CARE EDUCATION/TRAINING PROGRAM

## 2023-01-05 PROCEDURE — 90715 TDAP VACCINE 7 YRS/> IM: CPT | Mod: AT,,, | Performed by: STUDENT IN AN ORGANIZED HEALTH CARE EDUCATION/TRAINING PROGRAM

## 2023-01-05 PROCEDURE — 90472 IMMUNIZATION ADMIN EACH ADD: CPT | Mod: ,,, | Performed by: STUDENT IN AN ORGANIZED HEALTH CARE EDUCATION/TRAINING PROGRAM

## 2023-01-05 NOTE — PROGRESS NOTES
Subjective:       Patient ID: Shamika Pleitez is a 89 y.o. female.    Past Medical History:   CAD (coronary artery disease)  Diastolic dysfunction  Hearing loss  History of CVA (cerebrovascular accident)  Hypothyroidism  Paroxysmal atrial fibrillation  Primary hypertension     Chief Complaint: Nursing Home Visit (Elevate for placement)    Presents to the clinic for follow up. Planning on going to [facility] soon. Needs documents to be filled out. Needs to have her vaccinations up dated and checked for TB. Daughter is present for the visit as well. Got the XR and blood work done today. Doesn't need any medication refills. Had one mechanical fall recently. States that she tried to get out of the chair in the living room when her feet slipped on her and she fell to the floor. Had some bruising and a minor abrasion on the forearm, which are both healing. Her daughter and son-in-law had to help her up. No hitting her head.     Review of Systems   Constitutional:  Negative for chills, fatigue and fever.   Respiratory:  Negative for cough, shortness of breath and wheezing.    Cardiovascular:  Negative for chest pain, palpitations and leg swelling.   Gastrointestinal:  Negative for abdominal pain, diarrhea, nausea and vomiting.   Genitourinary:  Negative for dysuria and hematuria.   Musculoskeletal:         Mechanical fall    Integumentary:  Positive for wound.   Neurological:  Positive for weakness. Negative for dizziness and syncope.       Objective:      Physical Exam  Vitals and nursing note reviewed.   Constitutional:       General: She is not in acute distress.     Appearance: Normal appearance. She is not ill-appearing.   Eyes:      General: No scleral icterus.     Extraocular Movements: Extraocular movements intact.      Conjunctiva/sclera: Conjunctivae normal.      Pupils: Pupils are equal, round, and reactive to light.   Cardiovascular:      Rate and Rhythm: Normal rate and regular rhythm.       Pulses: Normal pulses.      Heart sounds: Normal heart sounds. No murmur heard.  Pulmonary:      Effort: Pulmonary effort is normal. No respiratory distress.      Breath sounds: Normal breath sounds. No wheezing.   Abdominal:      General: There is no distension.      Palpations: Abdomen is soft.      Tenderness: There is no abdominal tenderness.   Musculoskeletal:      Right lower leg: No edema.      Left lower leg: No edema.   Skin:     General: Skin is warm.      Coloration: Skin is not jaundiced.      Findings: Abrasion present.      Comments: Healing abrasion/bruise on forearm   Neurological:      Mental Status: She is alert. Mental status is at baseline.      Gait: Gait abnormal.      Comments: Uses Walker for ambulation   Psychiatric:         Mood and Affect: Mood normal.         Behavior: Behavior normal.       Assessment & Plan:   1. Encounter for examination for admission to nursing home  Comments:  Completed paperwork, faxed over along with recent medication list to [facility]  CXR was negative for TB   Gave PNA and Tdap vaccine today  Will get COVID booster at local pharmacy     2. Fall, initial encounter  Comments:  Mechanical fall (minor)  Unstable gait, uses walker   Would benefit from PT, ordered Home Health evaluation to be done at new facility  Orders:  -     (In Office Administered) Tdap Vaccine    3. Need for tetanus, diphtheria, and acellular pertussis (Tdap) vaccine  Comments:  Given fall with minor abrasion, gave tdap vaccine booster     4. Abrasion of right forearm, initial encounter  Comments:  See above for further details  Orders:  -     (In Office Administered) Tdap Vaccine    5. Need for Streptococcus pneumoniae vaccination  -     (In Office Administered) Pneumococcal Conjugate Vaccine (20 Valent) (IM)    6. Primary hypertension  Assessment & Plan:  Today's BP WNL  Continue Enalapril   Decrease Lasix to 20mg daily   Counseled on low sodium diet, exercise, tobacco avoidance,  and limiting alcohol intake   Pt. Has home BP cuff, instructed to measure home BP and report abnormal values     7. Paroxysmal atrial fibrillation  Assessment & Plan:  Non-valvular AF  [id number] score: >3  Continue Eliquis, discussed with daughter about switching to Warfarin since cheaper (explained that patient would have to adhere to a strict coumadin diet as well as INR checks (goal 2-3), states she will discuss with other daughter and let us know their decision) Also recommended discussing with facility if they have Home Health personnel who could collect the INR if they decide to switch back.   Defer further Management to Cardiology    8. Diastolic dysfunction  Assessment & Plan:  Stable   Defer to Cardiology   Appear to be dry  Will decrease Lasix to 20mg daily   Repeat BMP  Orders:  -     furosemide (LASIX) 20 MG tablet; Take 1 tablet (20 mg total) by mouth once daily.  Dispense: 90 tablet; Refill: 3    9. Hypercalcemia  Comments:  Suspect dehydration   Repeat BMP, ordered PTH level as well for futher evaluation  Orders:  -     Basic Metabolic Panel; Future; Expected date: 02/09/2023  -     PTH, Intact; Future; Expected date: 02/09/2023    10. Hyperkalemia  Comments:  Slightly improved  Discontinue potassium completely, decrease lasix to 20mg daily   Repeat BMP   Orders:  -     Basic Metabolic Panel; Future; Expected date: 02/09/2023    Follow up if symptoms worsen or fail to improve. In addition to their scheduled follow up, the patient has also been instructed to follow up on as needed basis.

## 2023-01-08 ENCOUNTER — PATIENT MESSAGE (OUTPATIENT)
Dept: PRIMARY CARE CLINIC | Facility: CLINIC | Age: 88
End: 2023-01-08
Payer: MEDICARE

## 2023-01-09 ENCOUNTER — TELEPHONE (OUTPATIENT)
Dept: PRIMARY CARE CLINIC | Facility: CLINIC | Age: 88
End: 2023-01-09
Payer: MEDICARE

## 2023-01-09 RX ORDER — FUROSEMIDE 20 MG/1
20 TABLET ORAL DAILY
Qty: 90 TABLET | Refills: 3 | Status: SHIPPED | OUTPATIENT
Start: 2023-01-09 | End: 2023-01-10 | Stop reason: SDUPTHER

## 2023-01-09 NOTE — ASSESSMENT & PLAN NOTE
Non-valvular AF  [id number] score: >3  Continue Eliquis, discussed with daughter about switching to Warfarin since cheaper (explained that patient would have to adhere to a strict coumadin diet as well as INR checks (goal 2-3), states she will discuss with other daughter and let us know their decision) Also recommended discussing with facility if they have Home Health personnel who could collect the INR if they decide to switch back.   Defer Management to Cardiology, keep scheduled appointment

## 2023-01-09 NOTE — ASSESSMENT & PLAN NOTE
Today's BP WNL  Continue Enalapril   Decrease Lasix to 20mg daily   Counseled on low sodium diet, exercise, tobacco avoidance, and limiting alcohol intake   Pt. Has home BP cuff, instructed to measure home BP and report abnormal values

## 2023-01-09 NOTE — TELEPHONE ENCOUNTER
[facility] is needing orders on all patients otc medications as well. (New mandatory orders request.   Rep did expressed how detail you are.

## 2023-01-10 DIAGNOSIS — I51.89 DIASTOLIC DYSFUNCTION: ICD-10-CM

## 2023-01-10 DIAGNOSIS — I10 PRIMARY HYPERTENSION: ICD-10-CM

## 2023-01-10 DIAGNOSIS — E78.2 MIXED HYPERLIPIDEMIA: ICD-10-CM

## 2023-01-10 DIAGNOSIS — E03.9 HYPOTHYROIDISM, UNSPECIFIED TYPE: ICD-10-CM

## 2023-01-10 DIAGNOSIS — I48.0 PAROXYSMAL ATRIAL FIBRILLATION: ICD-10-CM

## 2023-01-10 RX ORDER — BROMPHENIRAMINE MALEATE, DEXTROMETHORPHAN HBR, PHENYLEPHRINE HCL, DIPHENHYDRAMINE HCL, PHENYLEPHRINE HCL 0.52G
0.52 KIT ORAL DAILY
Qty: 30 CAPSULE | Refills: 11 | Status: SHIPPED | OUTPATIENT
Start: 2023-01-10

## 2023-01-10 RX ORDER — DOCUSATE SODIUM 100 MG/1
100 CAPSULE, LIQUID FILLED ORAL DAILY
Qty: 30 CAPSULE | Refills: 11 | Status: SHIPPED | OUTPATIENT
Start: 2023-01-10 | End: 2023-12-26 | Stop reason: SDUPTHER

## 2023-01-10 RX ORDER — BROMPHENIRAMINE MALEATE, DEXTROMETHORPHAN HBR, PHENYLEPHRINE HCL, DIPHENHYDRAMINE HCL, PHENYLEPHRINE HCL 0.52G
0.52 KIT ORAL DAILY
Qty: 30 CAPSULE | Refills: 11 | Status: SHIPPED | OUTPATIENT
Start: 2023-01-10 | End: 2023-01-10 | Stop reason: SDUPTHER

## 2023-01-10 RX ORDER — MULTIVITAMIN/IRON/FOLIC ACID 18MG-0.4MG
1 TABLET ORAL DAILY
Qty: 30 TABLET | Refills: 11 | Status: SHIPPED | OUTPATIENT
Start: 2023-01-10

## 2023-01-10 RX ORDER — APIXABAN 5 MG/1
5 TABLET, FILM COATED ORAL 2 TIMES DAILY
Qty: 180 TABLET | Refills: 3 | Status: SHIPPED | OUTPATIENT
Start: 2023-01-10 | End: 2023-10-05 | Stop reason: SDUPTHER

## 2023-01-10 RX ORDER — FUROSEMIDE 20 MG/1
20 TABLET ORAL DAILY
Qty: 90 TABLET | Refills: 3 | Status: SHIPPED | OUTPATIENT
Start: 2023-01-10 | End: 2023-02-22 | Stop reason: SDUPTHER

## 2023-01-10 RX ORDER — LEVOTHYROXINE SODIUM 75 UG/1
75 TABLET ORAL DAILY
Qty: 90 TABLET | Refills: 3 | Status: SHIPPED | OUTPATIENT
Start: 2023-01-10 | End: 2023-12-26 | Stop reason: SDUPTHER

## 2023-01-10 RX ORDER — ENALAPRIL MALEATE 10 MG/1
10 TABLET ORAL DAILY
Qty: 90 TABLET | Refills: 3 | Status: ON HOLD | OUTPATIENT
Start: 2023-01-10 | End: 2023-04-22 | Stop reason: HOSPADM

## 2023-01-10 RX ORDER — DICLOFENAC SODIUM 10 MG/G
2 GEL TOPICAL 4 TIMES DAILY PRN
Qty: 100 G | Refills: 11 | Status: SHIPPED | OUTPATIENT
Start: 2023-01-10 | End: 2023-04-12

## 2023-01-10 RX ORDER — ATORVASTATIN CALCIUM 20 MG/1
20 TABLET, FILM COATED ORAL DAILY
Qty: 90 TABLET | Refills: 3 | Status: SHIPPED | OUTPATIENT
Start: 2023-01-10 | End: 2023-10-05 | Stop reason: SDUPTHER

## 2023-01-10 NOTE — TELEPHONE ENCOUNTER
Please fax those orders over and let me know if they need anything else!    Thanks,  Isabella Miller MD

## 2023-01-21 ENCOUNTER — PATIENT MESSAGE (OUTPATIENT)
Dept: PRIMARY CARE CLINIC | Facility: CLINIC | Age: 88
End: 2023-01-21
Payer: MEDICARE

## 2023-01-23 ENCOUNTER — PATIENT MESSAGE (OUTPATIENT)
Dept: PRIMARY CARE CLINIC | Facility: CLINIC | Age: 88
End: 2023-01-23
Payer: MEDICARE

## 2023-01-27 ENCOUNTER — TELEPHONE (OUTPATIENT)
Dept: PRIMARY CARE CLINIC | Facility: CLINIC | Age: 88
End: 2023-01-27
Payer: MEDICARE

## 2023-01-27 DIAGNOSIS — R26.81 UNSTABLE GAIT: Primary | ICD-10-CM

## 2023-01-27 DIAGNOSIS — E87.8 ELECTROLYTE ABNORMALITY: ICD-10-CM

## 2023-01-27 NOTE — TELEPHONE ENCOUNTER
----- Message from Dmitriy Barrios MA sent at 1/27/2023 10:47 AM CST -----  Regarding: FW: orders needed    ----- Message -----  From: Leighann Sibley  Sent: 1/27/2023   8:41 AM CST  To: Angela Mason Staff  Subject: orders needed                                    Type:  Needs Medical Advice    Who Called: pt's daughter (Mylene)    Would the patient rather a call back or a response via [facility]? Call back  Best Call Back Number: [phone]  Additional Information: daughter called to ask Dr. Miller to send a order to [facility] stating patient is no longer taking potassium chloride (KLOR-CON) 10 MEQ TbSR. And they also need an order stating it is ok for patient to use a Hand rail on her bed.  Please call pt's daughter when all is sent.  The fax # to [facility] is [phone]

## 2023-01-30 RX ORDER — POTASSIUM CHLORIDE 750 MG/1
TABLET, EXTENDED RELEASE ORAL
Qty: 1 TABLET | Refills: 0 | Status: SHIPPED | OUTPATIENT
Start: 2023-01-30 | End: 2023-04-10

## 2023-01-30 NOTE — TELEPHONE ENCOUNTER
Please let the family know that I placed the orders for a hand rail for the patient to use on her bed as well as to discontinue the potassium supplementation.     Thanks  Isabella Miller MD     Yes

## 2023-02-20 ENCOUNTER — TELEPHONE (OUTPATIENT)
Dept: PRIMARY CARE CLINIC | Facility: CLINIC | Age: 88
End: 2023-02-20
Payer: MEDICARE

## 2023-02-20 ENCOUNTER — LAB VISIT (OUTPATIENT)
Dept: LAB | Facility: HOSPITAL | Age: 88
End: 2023-02-20
Attending: STUDENT IN AN ORGANIZED HEALTH CARE EDUCATION/TRAINING PROGRAM
Payer: MEDICARE

## 2023-02-20 DIAGNOSIS — E83.52 HYPERCALCEMIA: ICD-10-CM

## 2023-02-20 DIAGNOSIS — E87.5 HYPERKALEMIA: ICD-10-CM

## 2023-02-20 LAB
ANION GAP SERPL CALC-SCNC: 8 MEQ/L
BUN SERPL-MCNC: 19.3 MG/DL (ref 9.8–20.1)
CALCIUM SERPL-MCNC: 8.8 MG/DL (ref 8.4–10.2)
CHLORIDE SERPL-SCNC: 103 MMOL/L (ref 98–111)
CO2 SERPL-SCNC: 31 MMOL/L (ref 23–31)
CREAT SERPL-MCNC: 1.15 MG/DL (ref 0.55–1.02)
CREAT/UREA NIT SERPL: 17
GFR SERPLBLD CREATININE-BSD FMLA CKD-EPI: 45 MLS/MIN/1.73/M2
GLUCOSE SERPL-MCNC: 118 MG/DL (ref 75–121)
POTASSIUM SERPL-SCNC: 4.8 MMOL/L (ref 3.5–5.1)
PTH-INTACT SERPL-MCNC: 119.4 PG/ML (ref 8.7–77)
SODIUM SERPL-SCNC: 142 MMOL/L (ref 132–146)

## 2023-02-20 PROCEDURE — 36415 COLL VENOUS BLD VENIPUNCTURE: CPT

## 2023-02-20 PROCEDURE — 80048 BASIC METABOLIC PNL TOTAL CA: CPT

## 2023-02-20 PROCEDURE — 83970 ASSAY OF PARATHORMONE: CPT

## 2023-02-20 NOTE — TELEPHONE ENCOUNTER
----- Message from Iza Wheat sent at 2/20/2023  8:10 AM CST -----  Regarding: advice  Type:  Needs Medical Advice    Who Called: pt's daughter Belem  Symptoms (please be specific): legs swelling, knees down   How long has patient had these symptoms:  last week  Pharmacy name and phone #:    Would the patient rather a call back or a response via [facility]? C/b  Best Call Back Number: [phone]  Additional Information: probably due to lasix dosage have been reduced

## 2023-02-20 NOTE — TELEPHONE ENCOUNTER
----- Message from Dmitriy Barrios MA sent at 2/20/2023  9:27 AM CST -----  Regarding: FW: advice  Family wanting to go back previous dose, after lab results are reviewed. Patient want to get labs this morning.   ----- Message -----  From: Iza Wheat  Sent: 2/20/2023   8:13 AM CST  To: Angela [facility] Staff  Subject: advice                                           Type:  Needs Medical Advice    Who Called: pt's daughter Belem  Symptoms (please be specific): legs swelling, knees down   How long has patient had these symptoms:  last week  Pharmacy name and phone #:    Would the patient rather a call back or a response via [facility]? C/b  Best Call Back Number: [phone]  Additional Information: probably due to lasix dosage have been reduced

## 2023-02-22 ENCOUNTER — TELEPHONE (OUTPATIENT)
Dept: PRIMARY CARE CLINIC | Facility: CLINIC | Age: 88
End: 2023-02-22
Payer: MEDICARE

## 2023-02-22 DIAGNOSIS — I51.89 DIASTOLIC DYSFUNCTION: ICD-10-CM

## 2023-02-22 DIAGNOSIS — E21.3 HYPERPARATHYROIDISM: Primary | ICD-10-CM

## 2023-02-22 RX ORDER — FUROSEMIDE 40 MG/1
40 TABLET ORAL DAILY
Qty: 90 TABLET | Refills: 3 | Status: SHIPPED | OUTPATIENT
Start: 2023-02-22 | End: 2023-04-10

## 2023-02-22 NOTE — TELEPHONE ENCOUNTER
Please let the family know the following:    Repeat BMP showed no further elevated BUN and slight improvement in the creatinine. Appears to be at baseline and not dehydrated anymore.     Elevated calcium resolved. But inappropriately high PTH given calcium level. Concern for hyperparathyroidism.  At this time recommend discontinuing calcium supplementation, including antacids over-the-counter that could make her calcium worse.  Also will send a referral to endocrinology for further management and recommendations.    I am okay with the patient increasing her Lasix to 40 mg daily to help with the swelling, however patient needs to keep up with her hydration status to prevent reinjurying her kidneys.  Recommend drinking at least 60 oz water daily.    Thanks,  Isabella Miller MD

## 2023-02-22 NOTE — TELEPHONE ENCOUNTER
----- Message from Nikky Borrego sent at 2/22/2023  1:02 PM CST -----  Regarding: Medical Advice  Type:  Needs Medical Advice    Who Called: Belem  Symptoms (please be specific): Legs are swollen   How long has patient had these symptoms:    Pharmacy name and phone #:    Would the patient rather a call back or a response via [facility]? Call back  Best Call Back Number: [phone]    Additional Information: Belem called to speak with Delonte to inquire about her mothers condition.  She stated the Dr changed her lasic and potassium medication.  Now her legs are swelling and she is concerned. Requesting a call back.

## 2023-02-22 NOTE — TELEPHONE ENCOUNTER
Left hand pain. X3 days  Denies fall.  Legs swelling  Is requesting to go back to 40mg lasix.  In the past this has helped.

## 2023-02-23 ENCOUNTER — TELEPHONE (OUTPATIENT)
Dept: PRIMARY CARE CLINIC | Facility: CLINIC | Age: 88
End: 2023-02-23
Payer: MEDICARE
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
## 2023-02-23 DIAGNOSIS — M79.642 LEFT HAND PAIN: ICD-10-CM

## 2023-02-23 NOTE — TELEPHONE ENCOUNTER
Placed Xr for further evaluation.     Thanks,  Isabella Miller MD    OK to take OTC pain medication as needed.

## 2023-02-23 NOTE — TELEPHONE ENCOUNTER
----- Message from Dmitriy Barrios MA sent at 2/23/2023 11:59 AM CST -----  Regarding: FW: Med Advice  Spoke to daughter amanda  Yesterday issue started with swollen left hand. purplish  Moving-fingers last  Denies falling put daughter fell she is not remember  ----- Message -----  From: Olivia Holcomb  Sent: 2/23/2023  11:25 AM CST  To: Angela Mason Staff  Subject: Med Advice                                       .Type:  Needs Medical Advice    Who Called: pt's daughter  Symptoms (please be specific): left hand pain   How long has patient had these symptoms:  2 days  Would the patient rather a call back or a response via [facility]? Call back   Best Call Back Number: [phone]- [place]  Additional Information: pt's daughter would like to get an order for an hand xray for her mother from a fall and she's in a lot of pain, wanted to schedule an appointment for today no available appointment, please advise.

## 2023-02-24 ENCOUNTER — TELEPHONE (OUTPATIENT)
Dept: PRIMARY CARE CLINIC | Facility: CLINIC | Age: 88
End: 2023-02-24
Payer: MEDICARE

## 2023-02-24 DIAGNOSIS — M79.642 LEFT HAND PAIN: ICD-10-CM

## 2023-02-24 DIAGNOSIS — M79.641 PAIN OF RIGHT HAND: Primary | ICD-10-CM

## 2023-02-24 RX ORDER — ACETAMINOPHEN 500 MG
500 TABLET ORAL EVERY 6 HOURS PRN
Qty: 100 TABLET | Refills: 3 | Status: SHIPPED | OUTPATIENT
Start: 2023-02-24

## 2023-02-24 RX ORDER — NAPROXEN SODIUM 220 MG
220 TABLET ORAL 2 TIMES DAILY PRN
Qty: 100 TABLET | Refills: 2 | Status: ON HOLD | OUTPATIENT
Start: 2023-02-24 | End: 2023-04-22 | Stop reason: HOSPADM

## 2023-03-06 ENCOUNTER — TELEPHONE (OUTPATIENT)
Dept: PRIMARY CARE CLINIC | Facility: CLINIC | Age: 88
End: 2023-03-06
Payer: MEDICARE

## 2023-03-06 ENCOUNTER — PATIENT MESSAGE (OUTPATIENT)
Dept: PRIMARY CARE CLINIC | Facility: CLINIC | Age: 88
End: 2023-03-06
Payer: MEDICARE

## 2023-03-06 RX ORDER — FUROSEMIDE 20 MG/1
20 TABLET ORAL NIGHTLY PRN
Qty: 30 TABLET | Refills: 11 | Status: SHIPPED | OUTPATIENT
Start: 2023-03-06 | End: 2023-04-10

## 2023-03-06 NOTE — TELEPHONE ENCOUNTER
Please fax script to [facility].      We will do Lasix as needed 20 mg night per lower extremity swelling edema.  However they need to be very careful with her hydration status.  Recommend drinking at least 60 oz of fluids daily.      Also this symptom could be due to her chronic venous insufficiency.  So please make sure that Ms. Wick is wearing compression stockings during the day, elevating her legs as much as possible, and low salt diet.  Also avoid prolonged sitting and standing.     Thanks,   Isabella Miller MD

## 2023-03-06 NOTE — TELEPHONE ENCOUNTER
----- Message from Princess Mcclure sent at 3/6/2023 11:38 AM CST -----  Regarding: FW: Patient Returning Call  I routed portal message to you, please advise.   ----- Message -----  From: Olivia Holcomb  Sent: 3/6/2023   8:19 AM CST  To: Angela Mason Staff  Subject: Patient Returning Call                           .Type:  Patient Returning Call    Who Called:pt's daughter-Belem  Who Left Message for Patient:Dmitriy  Does the patient know what this is regarding?:status of message from portal   Would the patient rather a call back or a response via [facility]?   Best Call Back Number:[phone]  Additional Information: pt's daughter-Belem stated she sent an message in portal and just checking the status of the message, please advise

## 2023-04-10 ENCOUNTER — TELEPHONE (OUTPATIENT)
Dept: PRIMARY CARE CLINIC | Facility: CLINIC | Age: 88
End: 2023-04-10

## 2023-04-10 ENCOUNTER — OFFICE VISIT (OUTPATIENT)
Dept: PRIMARY CARE CLINIC | Facility: CLINIC | Age: 88
End: 2023-04-10
Payer: MEDICARE

## 2023-04-10 ENCOUNTER — LAB VISIT (OUTPATIENT)
Dept: LAB | Facility: HOSPITAL | Age: 88
End: 2023-04-10
Attending: STUDENT IN AN ORGANIZED HEALTH CARE EDUCATION/TRAINING PROGRAM
Payer: MEDICARE

## 2023-04-10 VITALS
WEIGHT: 186 LBS | HEART RATE: 101 BPM | TEMPERATURE: 98 F | HEIGHT: 62 IN | SYSTOLIC BLOOD PRESSURE: 130 MMHG | BODY MASS INDEX: 34.23 KG/M2 | DIASTOLIC BLOOD PRESSURE: 86 MMHG | RESPIRATION RATE: 20 BRPM | OXYGEN SATURATION: 98 %

## 2023-04-10 DIAGNOSIS — M79.89 LOCALIZED SWELLING OF LOWER EXTREMITY: ICD-10-CM

## 2023-04-10 DIAGNOSIS — I38 VALVULAR HEART DISEASE: ICD-10-CM

## 2023-04-10 DIAGNOSIS — I51.89 DIASTOLIC DYSFUNCTION: Chronic | ICD-10-CM

## 2023-04-10 DIAGNOSIS — E03.9 HYPOTHYROIDISM, UNSPECIFIED TYPE: Chronic | ICD-10-CM

## 2023-04-10 DIAGNOSIS — R39.9 URINARY TRACT INFECTION SYMPTOMS: Primary | ICD-10-CM

## 2023-04-10 DIAGNOSIS — R06.2 WHEEZING: ICD-10-CM

## 2023-04-10 DIAGNOSIS — I87.2 CHRONIC VENOUS INSUFFICIENCY OF LOWER EXTREMITY: Chronic | ICD-10-CM

## 2023-04-10 LAB
ALBUMIN SERPL-MCNC: 3.7 G/DL (ref 3.4–4.8)
ALBUMIN/GLOB SERPL: 1.3 RATIO (ref 1.1–2)
ALP SERPL-CCNC: 109 UNIT/L (ref 40–150)
ALT SERPL-CCNC: 14 UNIT/L (ref 0–55)
APPEARANCE UR: ABNORMAL
AST SERPL-CCNC: 24 UNIT/L (ref 5–34)
BACTERIA #/AREA URNS AUTO: NORMAL /HPF
BILIRUB UR QL STRIP.AUTO: NEGATIVE MG/DL
BILIRUBIN DIRECT+TOT PNL SERPL-MCNC: 1.1 MG/DL
BNP BLD-MCNC: 309.8 PG/ML
BUN SERPL-MCNC: 23.8 MG/DL (ref 9.8–20.1)
CALCIUM SERPL-MCNC: 9.2 MG/DL (ref 8.4–10.2)
CHLORIDE SERPL-SCNC: 102 MMOL/L (ref 98–111)
CO2 SERPL-SCNC: 30 MMOL/L (ref 23–31)
COLOR UR AUTO: YELLOW
CREAT SERPL-MCNC: 1.41 MG/DL (ref 0.55–1.02)
CREAT UR-MCNC: 29.1 MG/DL (ref 47–110)
GFR SERPLBLD CREATININE-BSD FMLA CKD-EPI: 35 MLS/MIN/1.73/M2
GLOBULIN SER-MCNC: 2.8 GM/DL (ref 2.4–3.5)
GLUCOSE SERPL-MCNC: 90 MG/DL (ref 75–121)
GLUCOSE UR QL STRIP.AUTO: NEGATIVE MG/DL
KETONES UR QL STRIP.AUTO: NEGATIVE MG/DL
LEUKOCYTE ESTERASE UR QL STRIP.AUTO: NEGATIVE UNIT/L
MAGNESIUM SERPL-MCNC: 2.1 MG/DL (ref 1.6–2.6)
NITRITE UR QL STRIP.AUTO: NEGATIVE
PH UR STRIP.AUTO: 7 [PH]
POTASSIUM SERPL-SCNC: 4.9 MMOL/L (ref 3.5–5.1)
PROT SERPL-MCNC: 6.5 GM/DL (ref 5.8–7.6)
PROT UR QL STRIP.AUTO: NEGATIVE MG/DL
PROT UR STRIP-MCNC: <6.8 MG/DL
RBC #/AREA URNS AUTO: <5 /HPF
RBC UR QL AUTO: NEGATIVE UNIT/L
SODIUM SERPL-SCNC: 142 MMOL/L (ref 132–146)
SP GR UR STRIP.AUTO: 1.01 (ref 1–1.03)
SQUAMOUS #/AREA URNS AUTO: <5 /HPF
TSH SERPL-ACNC: 1.75 UIU/ML (ref 0.35–4.94)
UROBILINOGEN UR STRIP-ACNC: 0.2 MG/DL
WBC #/AREA URNS AUTO: <5 /HPF

## 2023-04-10 PROCEDURE — 83735 ASSAY OF MAGNESIUM: CPT

## 2023-04-10 PROCEDURE — 36415 COLL VENOUS BLD VENIPUNCTURE: CPT

## 2023-04-10 PROCEDURE — 99214 PR OFFICE/OUTPT VISIT, EST, LEVL IV, 30-39 MIN: ICD-10-PCS | Mod: ,,, | Performed by: STUDENT IN AN ORGANIZED HEALTH CARE EDUCATION/TRAINING PROGRAM

## 2023-04-10 PROCEDURE — 83880 ASSAY OF NATRIURETIC PEPTIDE: CPT

## 2023-04-10 PROCEDURE — 99214 OFFICE O/P EST MOD 30 MIN: CPT | Mod: ,,, | Performed by: STUDENT IN AN ORGANIZED HEALTH CARE EDUCATION/TRAINING PROGRAM

## 2023-04-10 PROCEDURE — 80053 COMPREHEN METABOLIC PANEL: CPT

## 2023-04-10 PROCEDURE — 84443 ASSAY THYROID STIM HORMONE: CPT

## 2023-04-10 RX ORDER — BUMETANIDE 1 MG/1
1 TABLET ORAL 2 TIMES DAILY
Qty: 60 TABLET | Refills: 11 | Status: ON HOLD | OUTPATIENT
Start: 2023-04-10 | End: 2023-04-22 | Stop reason: SDUPTHER

## 2023-04-10 RX ORDER — NITROFURANTOIN 25; 75 MG/1; MG/1
100 CAPSULE ORAL 2 TIMES DAILY
Qty: 14 CAPSULE | Refills: 0 | OUTPATIENT
Start: 2023-04-10 | End: 2023-04-22

## 2023-04-10 NOTE — PROGRESS NOTES
Subjective:       Patient ID: Shamika Pleitez is a 89 y.o. female.    Past Medical History:   CAD (coronary artery disease)  Diastolic dysfunction  Hearing loss  History of CVA (cerebrovascular accident)  Hypothyroidism  Paroxysmal atrial fibrillation  Primary hypertension     Chief Complaint: Foot Swelling, Leg Swelling, Fatigue, and Depression    Presents to the clinic for follow up, same day appointment. Daughter present as well. Endorsed worsening lower extremity swelling, fatigue, and wheezing. Further investigation revealed urinary frequency as well. Endorsed 100% compliance with lasix, doesn't appear to be working. Elevates legs, avoids prolonged standing/sitting. Been exercising/walking more since moving to assistant living. Uses compression boots for 1 hour daily.     Review of Systems   Constitutional:  Positive for fatigue. Negative for chills and fever.   Respiratory:  Positive for wheezing. Negative for cough.    Cardiovascular:  Positive for leg swelling. Negative for chest pain.   Gastrointestinal:  Negative for abdominal pain and vomiting.   Genitourinary:  Positive for frequency. Negative for dysuria and hematuria.   Neurological:  Negative for syncope and weakness.       Objective:      Physical Exam  Vitals and nursing note reviewed.   Constitutional:       General: She is not in acute distress.     Appearance: Normal appearance. She is not ill-appearing.   Eyes:      General: No scleral icterus.     Extraocular Movements: Extraocular movements intact.      Conjunctiva/sclera: Conjunctivae normal.      Pupils: Pupils are equal, round, and reactive to light.   Cardiovascular:      Rate and Rhythm: Normal rate and regular rhythm.      Pulses: Normal pulses.      Heart sounds: Normal heart sounds. No murmur heard.  Pulmonary:      Effort: Pulmonary effort is normal. No respiratory distress.      Breath sounds: Wheezing present.   Abdominal:      General: There is no distension.      Palpations:  Abdomen is soft.      Tenderness: There is no abdominal tenderness.   Musculoskeletal:      Right lower leg: Edema present.      Left lower leg: Edema present.   Skin:     General: Skin is warm.      Coloration: Skin is not jaundiced.   Neurological:      Mental Status: She is alert. Mental status is at baseline.      Gait: Gait normal.   Psychiatric:         Mood and Affect: Mood normal.         Behavior: Behavior normal.       Assessment & Plan:   1. Urinary tract infection symptoms  Comments:  Endorsed urinary frequency   POC dipstick showed signs of infection  Start Macrobid   Ordered UA and Urine Culture for further eval   Orders:  -     nitrofurantoin, macrocrystal-monohydrate, (MACROBID) 100 MG capsule; Take 1 capsule (100 mg total) by mouth 2 (two) times daily. for 7 days  Dispense: 14 capsule; Refill: 0  -     Urinalysis, Reflex to Urine Culture    2. Diastolic dysfunction  Assessment & Plan:  Worsening   Concern for acute exacerbation   No improvement with Lasix, discontinue   Start Bumex 1mg BID   Ordered Echocardiogram   Defer Cardiology for further management   Orders:  -     X-Ray Chest PA And Lateral; Future; Expected date: 04/10/2023  -     Comprehensive Metabolic Panel; Future; Expected date: 04/10/2023  -     Magnesium; Future; Expected date: 04/10/2023  -     bumetanide (BUMEX) 1 MG tablet; Take 1 tablet (1 mg total) by mouth 2 (two) times a day.  Dispense: 60 tablet; Refill: 11  -     TSH; Future; Expected date: 04/10/2023  -     BNP; Future; Expected date: 04/10/2023  -     Echo; Future; Expected date: 04/10/2023  -     Protein/Creatinine Ratio, Urine    3. Chronic venous insufficiency of lower extremity  Assessment & Plan:  Edema noted   Continue compression boots for 1hr per day   Wear compression stockings as tolerated, avoid prolonged sitting/standing, elevate legs as much as possible   No improvement with Lasix, discontinue  Start Bumex   Encouraged low sodium intake, good PO hydration    Orders:  -     X-Ray Chest PA And Lateral; Future; Expected date: 04/10/2023  -     Comprehensive Metabolic Panel; Future; Expected date: 04/10/2023  -     Magnesium; Future; Expected date: 04/10/2023  -     bumetanide (BUMEX) 1 MG tablet; Take 1 tablet (1 mg total) by mouth 2 (two) times a day.  Dispense: 60 tablet; Refill: 11  -     TSH; Future; Expected date: 04/10/2023  -     BNP; Future; Expected date: 04/10/2023  -     Echo; Future; Expected date: 04/10/2023  -     Protein/Creatinine Ratio, Urine    4. Valvular heart disease  Assessment & Plan:  History of Mitral regurgitation   Given LE swelling, obtain Echocardiogram for further evaluation of EF and heart valves   Orders:  -     BNP; Future; Expected date: 04/10/2023  -     Echo; Future; Expected date: 04/10/2023  -     Protein/Creatinine Ratio, Urine    5. Localized swelling of lower extremity  Comments:  Concerned for acute HF excerbation   Discontinue Lasix, start Bumex 1mg BID   Ordered CMP, TSH, BNP, urine protein/creatinine ratio   Orders:  -     X-Ray Chest PA And Lateral; Future; Expected date: 04/10/2023  -     Comprehensive Metabolic Panel; Future; Expected date: 04/10/2023  -     Magnesium; Future; Expected date: 04/10/2023  -     bumetanide (BUMEX) 1 MG tablet; Take 1 tablet (1 mg total) by mouth 2 (two) times a day.  Dispense: 60 tablet; Refill: 11  -     TSH; Future; Expected date: 04/10/2023  -     BNP; Future; Expected date: 04/10/2023  -     Echo; Future; Expected date: 04/10/2023  -     Protein/Creatinine Ratio, Urine    6. Wheezing  Comments:  Suspect cardiac asthma   Ordered CXR r/o pleural effusion, PNA   Start Bumex 1mg BID   Orders:  -     X-Ray Chest PA And Lateral; Future; Expected date: 04/10/2023  -     Comprehensive Metabolic Panel; Future; Expected date: 04/10/2023  -     Magnesium; Future; Expected date: 04/10/2023  -     TSH; Future; Expected date: 04/10/2023  -     BNP; Future; Expected date: 04/10/2023  -     Echo; Future;  Expected date: 04/10/2023  -     Protein/Creatinine Ratio, Urine    7. Hypothyroidism, unspecified type  Assessment & Plan:  Stable, continue Levothyroxine  Given worsening fatigue, lower extremity swelling will repeat TSH to ensure that it is WNL/well controlled  Encouraged strict medication compliance (take in the AM on an empty stomach with a full glass of water, no food/drink or other medications for >30 minutes)   Orders:  -     TSH; Future; Expected date: 04/10/2023    Follow up in about 2 weeks (around 4/24/2023). In addition to their scheduled follow up, the patient has also been instructed to follow up on as needed basis.

## 2023-04-10 NOTE — TELEPHONE ENCOUNTER
----- Message from [facility] sent at 4/10/2023  8:12 AM CDT -----  Regarding: sooner appt  .Type:  Sooner Apoointment Request    Caller is requesting a sooner appointment.  Caller declined first available appointment listed below.  Caller will not accept being placed on the waitlist and is requesting a message be sent to doctor.  Name of Caller: Mylene Cullen pt's daughter    When is the first available appointment? 4-13-23    Symptoms: legs swelling, breathing is slow and wheezing patient has problems breathing when walking    Would the patient rather a call back? Yes    Best Call Back Number: [phone]    Additional Information:  next appt is 4-13-23 pt is 89 years old with congested heart failure and her legs are swelling daughter is concerned it may be fluid building up again

## 2023-04-10 NOTE — TELEPHONE ENCOUNTER
----- Message from Yevgeniy Flores sent at 4/10/2023  8:34 AM CDT -----  .Type:  Needs Medical Advice    Who Called: [place] pt Daughter   Symptoms (please be specific):    How long has patient had these symptoms:    Pharmacy name and phone #:    Would the patient rather a call back or a response via [facility]?   Best Call Back Number: [phone]  Additional Information: She needed to speak with the office re: her mother's apt on Thursday, she is concerned as her legs are swelling and she wanted to see if she can be seen sooner.

## 2023-04-10 NOTE — TELEPHONE ENCOUNTER
SPOKE TO DAUGHTER    Reports wheezing increase and legs swelling.  Does she need appt with us ?  Endocrin- appt in may.  SEEN TODAY/

## 2023-04-12 ENCOUNTER — PATIENT MESSAGE (OUTPATIENT)
Dept: PRIMARY CARE CLINIC | Facility: CLINIC | Age: 88
End: 2023-04-12
Payer: MEDICARE

## 2023-04-12 NOTE — ASSESSMENT & PLAN NOTE
Stable, continue Levothyroxine  Given worsening fatigue, lower extremity swelling will repeat TSH to ensure that it is WNL/well controlled  Encouraged strict medication compliance (take in the AM on an empty stomach with a full glass of water, no food/drink or other medications for >30 minutes)

## 2023-04-12 NOTE — ASSESSMENT & PLAN NOTE
Edema noted   Continue compression boots for 1hr per day   Wear compression stockings as tolerated, avoid prolonged sitting/standing, elevate legs as much as possible   No improvement with Lasix, discontinue  Start Bumex   Encouraged low sodium intake, good PO hydration

## 2023-04-12 NOTE — ASSESSMENT & PLAN NOTE
Worsening   Concern for acute exacerbation   No improvement with Lasix, discontinue   Start Bumex 1mg BID   Ordered Echocardiogram   Defer Cardiology for further management

## 2023-04-12 NOTE — ASSESSMENT & PLAN NOTE
History of Mitral regurgitation   Given LE swelling, obtain Echocardiogram for further evaluation of EF and heart valves

## 2023-04-12 NOTE — TELEPHONE ENCOUNTER
Please discontinue the Voltaren Gel. See attached new medication list.     Dmitriy - can you please fax a new medication list to [facility].  Thank you so much.

## 2023-04-18 ENCOUNTER — TELEPHONE (OUTPATIENT)
Dept: PRIMARY CARE CLINIC | Facility: CLINIC | Age: 88
End: 2023-04-18
Payer: MEDICARE

## 2023-04-18 ENCOUNTER — HOSPITAL ENCOUNTER (INPATIENT)
Facility: HOSPITAL | Age: 88
LOS: 4 days | Discharge: HOME HEALTH | DRG: 291 | End: 2023-04-22
Attending: STUDENT IN AN ORGANIZED HEALTH CARE EDUCATION/TRAINING PROGRAM | Admitting: INTERNAL MEDICINE
Payer: MEDICARE

## 2023-04-18 DIAGNOSIS — M79.89 LOCALIZED SWELLING OF LOWER EXTREMITY: ICD-10-CM

## 2023-04-18 DIAGNOSIS — I50.9 CHF (CONGESTIVE HEART FAILURE): ICD-10-CM

## 2023-04-18 DIAGNOSIS — I51.89 DIASTOLIC DYSFUNCTION: Chronic | ICD-10-CM

## 2023-04-18 DIAGNOSIS — N17.9 AKI (ACUTE KIDNEY INJURY): ICD-10-CM

## 2023-04-18 DIAGNOSIS — I50.9 ACUTE CHF (CONGESTIVE HEART FAILURE): Primary | ICD-10-CM

## 2023-04-18 DIAGNOSIS — I87.2 CHRONIC VENOUS INSUFFICIENCY OF LOWER EXTREMITY: Chronic | ICD-10-CM

## 2023-04-18 LAB
ALBUMIN SERPL-MCNC: 3.8 G/DL (ref 3.4–4.8)
ALBUMIN/GLOB SERPL: 1.2 RATIO (ref 1.1–2)
ALP SERPL-CCNC: 121 UNIT/L (ref 40–150)
ALT SERPL-CCNC: 15 UNIT/L (ref 0–55)
APPEARANCE UR: CLEAR
AST SERPL-CCNC: 26 UNIT/L (ref 5–34)
BACTERIA #/AREA URNS AUTO: NORMAL /HPF
BASOPHILS # BLD AUTO: 0.07 X10(3)/MCL (ref 0–0.2)
BASOPHILS NFR BLD AUTO: 0.7 %
BILIRUB UR QL STRIP.AUTO: NEGATIVE MG/DL
BILIRUBIN DIRECT+TOT PNL SERPL-MCNC: 1.1 MG/DL
BNP BLD-MCNC: 174.9 PG/ML
BUN SERPL-MCNC: 34.6 MG/DL (ref 9.8–20.1)
CALCIUM SERPL-MCNC: 9.3 MG/DL (ref 8.4–10.2)
CHLORIDE SERPL-SCNC: 100 MMOL/L (ref 98–111)
CO2 SERPL-SCNC: 26 MMOL/L (ref 23–31)
COLOR UR AUTO: YELLOW
CREAT SERPL-MCNC: 1.43 MG/DL (ref 0.55–1.02)
EOSINOPHIL # BLD AUTO: 0.43 X10(3)/MCL (ref 0–0.9)
EOSINOPHIL NFR BLD AUTO: 4.5 %
ERYTHROCYTE [DISTWIDTH] IN BLOOD BY AUTOMATED COUNT: 13.2 % (ref 11.5–17)
GFR SERPLBLD CREATININE-BSD FMLA CKD-EPI: 35 MLS/MIN/1.73/M2
GLOBULIN SER-MCNC: 3.1 GM/DL (ref 2.4–3.5)
GLUCOSE SERPL-MCNC: 101 MG/DL (ref 75–121)
GLUCOSE UR QL STRIP.AUTO: NEGATIVE MG/DL
HCT VFR BLD AUTO: 50.5 % (ref 37–47)
HGB BLD-MCNC: 16.4 G/DL (ref 12–16)
IMM GRANULOCYTES # BLD AUTO: 0.03 X10(3)/MCL (ref 0–0.04)
IMM GRANULOCYTES NFR BLD AUTO: 0.3 %
KETONES UR QL STRIP.AUTO: NEGATIVE MG/DL
LEUKOCYTE ESTERASE UR QL STRIP.AUTO: NEGATIVE UNIT/L
LYMPHOCYTES # BLD AUTO: 2.27 X10(3)/MCL (ref 0.6–4.6)
LYMPHOCYTES NFR BLD AUTO: 23.8 %
MCH RBC QN AUTO: 32.5 PG (ref 27–31)
MCHC RBC AUTO-ENTMCNC: 32.5 G/DL (ref 33–36)
MCV RBC AUTO: 100 FL (ref 80–94)
MONOCYTES # BLD AUTO: 1.2 X10(3)/MCL (ref 0.1–1.3)
MONOCYTES NFR BLD AUTO: 12.6 %
NEUTROPHILS # BLD AUTO: 5.53 X10(3)/MCL (ref 2.1–9.2)
NEUTROPHILS NFR BLD AUTO: 58.1 %
NITRITE UR QL STRIP.AUTO: NEGATIVE
NRBC BLD AUTO-RTO: 0 %
PH UR STRIP.AUTO: 5.5 [PH]
PLATELET # BLD AUTO: 179 X10(3)/MCL (ref 130–400)
PMV BLD AUTO: 9.6 FL (ref 7.4–10.4)
POTASSIUM SERPL-SCNC: 4.5 MMOL/L (ref 3.5–5.1)
PROT SERPL-MCNC: 6.9 GM/DL (ref 5.8–7.6)
PROT UR QL STRIP.AUTO: NEGATIVE MG/DL
RBC # BLD AUTO: 5.05 X10(6)/MCL (ref 4.2–5.4)
RBC #/AREA URNS AUTO: <5 /HPF
RBC UR QL AUTO: NEGATIVE UNIT/L
SODIUM SERPL-SCNC: 137 MMOL/L (ref 132–146)
SP GR UR STRIP.AUTO: 1.01 (ref 1–1.03)
SQUAMOUS #/AREA URNS AUTO: <5 /HPF
UROBILINOGEN UR STRIP-ACNC: 0.2 MG/DL
WBC # SPEC AUTO: 9.5 X10(3)/MCL (ref 4.5–11.5)
WBC #/AREA URNS AUTO: <5 /HPF

## 2023-04-18 PROCEDURE — 85025 COMPLETE CBC W/AUTO DIFF WBC: CPT | Performed by: PHYSICIAN ASSISTANT

## 2023-04-18 PROCEDURE — 93005 ELECTROCARDIOGRAM TRACING: CPT

## 2023-04-18 PROCEDURE — 96374 THER/PROPH/DIAG INJ IV PUSH: CPT

## 2023-04-18 PROCEDURE — 11000001 HC ACUTE MED/SURG PRIVATE ROOM

## 2023-04-18 PROCEDURE — 81001 URINALYSIS AUTO W/SCOPE: CPT | Performed by: PHYSICIAN ASSISTANT

## 2023-04-18 PROCEDURE — 93010 EKG 12-LEAD: ICD-10-PCS | Mod: ,,, | Performed by: INTERNAL MEDICINE

## 2023-04-18 PROCEDURE — 99285 EMERGENCY DEPT VISIT HI MDM: CPT | Mod: 25

## 2023-04-18 PROCEDURE — 83880 ASSAY OF NATRIURETIC PEPTIDE: CPT | Performed by: PHYSICIAN ASSISTANT

## 2023-04-18 PROCEDURE — 25000003 PHARM REV CODE 250: Performed by: EMERGENCY MEDICINE

## 2023-04-18 PROCEDURE — 93010 ELECTROCARDIOGRAM REPORT: CPT | Mod: ,,, | Performed by: INTERNAL MEDICINE

## 2023-04-18 PROCEDURE — 80053 COMPREHEN METABOLIC PANEL: CPT | Performed by: PHYSICIAN ASSISTANT

## 2023-04-18 RX ORDER — FUROSEMIDE 10 MG/ML
40 INJECTION INTRAMUSCULAR; INTRAVENOUS
Status: DISCONTINUED | OUTPATIENT
Start: 2023-04-18 | End: 2023-04-18

## 2023-04-18 RX ORDER — ENALAPRILAT 1.25 MG/ML
1.25 INJECTION INTRAVENOUS
Status: COMPLETED | OUTPATIENT
Start: 2023-04-18 | End: 2023-04-18

## 2023-04-18 RX ADMIN — ENALAPRILAT 1.25 MG: 2.5 INJECTION INTRAVENOUS at 08:04

## 2023-04-18 NOTE — Clinical Note
Diagnosis: CHF (congestive heart failure) [[id number]]   Admitting Provider:: JUANY LYNNE [[id number]]   Future Attending Provider: JUANY LYNNE [[id number]]   Reason for IP Medical Treatment  (Clinical interventions that can only be accomplished in the IP setting? ) :: LEONOR, sent by PCP for IV albumin/IV lasix, LEONOR due to IVVD?   I certify that Inpatient services for greater than or equal to 2 midnights are medically necessary:: Yes   Plans for Post-Acute care--if anticipated (pick the single best option):: A. No post acute care anticipated at this time

## 2023-04-18 NOTE — TELEPHONE ENCOUNTER
----- Message from Olivia Holcomb sent at 4/18/2023 11:38 AM CDT -----  Regarding: Patient Return call  .Type:  Patient Returning Call    Who Called:pt's daughterEsther  Who Left Message for Patient:returning call   Does the patient know what this is regarding?:missed call   Would the patient rather a call back or a response via [facility]?   Best Call Back Number:-[phone]  Additional Information: pt is returning call for her mother

## 2023-04-18 NOTE — TELEPHONE ENCOUNTER
----- Message from Olivia Holcomb sent at 4/18/2023 12:36 PM CDT -----  Regarding: Pateint Returning Call  .Type:  Patient Returning Call    Who Called:pt's daughter-Ariadna  Who Left Message for Patient:pt's daughterEsther  Does the patient know what this is regarding?:returning call  Would the patient rather a call back or a response via [facility]?   Best Call Back [phone]  Additional Information: please return call

## 2023-04-18 NOTE — TELEPHONE ENCOUNTER
Tried calling both Ms. Patricia as well as Ms. Rosenthal about Ms. Wick's recent labs. Unfortunately, her kidney function has not improved.  And now she is developing a metabolic alkalosis due to intravascular depletion from the aggressive diuresis.  When she presented 1 week ago.  She was clearly overloaded with prominent lower extremity swelling extending above the knee bilaterally, as well as a small pleural effusion noted in the chest x-ray and elevated BNP.  She was not responding to Lasix so we switched her to Bumex to help.  Repeat labs unfortunately showed no improvement it      At this time I believe that the patient needs to present herself to the ER for further management in order to pull the extra fluid from her extravascular space into her intravascular space without depleting her intravascular space further.  Patient may need IV diuretics along with possibly an albumin drip to help shift of fluid to its appropriate spaces.  She may also need consult with Nephrology.  Will defer further management to the ER physicians and hospitalist team.

## 2023-04-19 LAB
ANION GAP SERPL CALC-SCNC: 6 MEQ/L
AV INDEX (PROSTH): 0.6
AV MEAN GRADIENT: 4 MMHG
AV PEAK GRADIENT: 8 MMHG
AV VALVE AREA: 1.7 CM2
AV VELOCITY RATIO: 0.62
BASOPHILS # BLD AUTO: 0.06 X10(3)/MCL (ref 0–0.2)
BASOPHILS NFR BLD AUTO: 0.7 %
BSA FOR ECHO PROCEDURE: 1.92 M2
BUN SERPL-MCNC: 31.6 MG/DL (ref 9.8–20.1)
CALCIUM SERPL-MCNC: 8.6 MG/DL (ref 8.4–10.2)
CHLORIDE SERPL-SCNC: 104 MMOL/L (ref 98–111)
CO2 SERPL-SCNC: 29 MMOL/L (ref 23–31)
CREAT SERPL-MCNC: 1.14 MG/DL (ref 0.55–1.02)
CREAT UR-MCNC: 79.6 MG/DL (ref 47–110)
CREAT/UREA NIT SERPL: 28
CV ECHO LV RWT: 0.61 CM
DOP CALC AO PEAK VEL: 1.41 M/S
DOP CALC AO VTI: 25.3 CM
DOP CALC LVOT AREA: 2.8 CM2
DOP CALC LVOT DIAMETER: 1.9 CM
DOP CALC LVOT PEAK VEL: 0.87 M/S
DOP CALC LVOT STROKE VOLUME: 43.07 CM3
DOP CALC MV VTI: 26.3 CM
DOP CALCLVOT PEAK VEL VTI: 15.2 CM
E WAVE DECELERATION TIME: 164 MSEC
E/E' RATIO: 14 M/S
ECHO LV POSTERIOR WALL: 1.15 CM (ref 0.6–1.1)
EJECTION FRACTION: 65 %
EOSINOPHIL # BLD AUTO: 0.41 X10(3)/MCL (ref 0–0.9)
EOSINOPHIL NFR BLD AUTO: 4.9 %
ERYTHROCYTE [DISTWIDTH] IN BLOOD BY AUTOMATED COUNT: 13.1 % (ref 11.5–17)
FRACTIONAL SHORTENING: 34 % (ref 28–44)
GFR SERPLBLD CREATININE-BSD FMLA CKD-EPI: 46 MLS/MIN/1.73/M2
GLUCOSE SERPL-MCNC: 111 MG/DL (ref 75–121)
HCT VFR BLD AUTO: 44.1 % (ref 37–47)
HGB BLD-MCNC: 14.5 G/DL (ref 12–16)
IMM GRANULOCYTES # BLD AUTO: 0.02 X10(3)/MCL (ref 0–0.04)
IMM GRANULOCYTES NFR BLD AUTO: 0.2 %
INTERVENTRICULAR SEPTUM: 1.12 CM (ref 0.6–1.1)
LEFT ATRIUM SIZE: 4.6 CM
LEFT ATRIUM VOLUME INDEX MOD: 48.6 ML/M2
LEFT ATRIUM VOLUME MOD: 89.9 CM3
LEFT INTERNAL DIMENSION IN SYSTOLE: 2.49 CM (ref 2.1–4)
LEFT VENTRICLE DIASTOLIC VOLUME INDEX: 33.08 ML/M2
LEFT VENTRICLE DIASTOLIC VOLUME: 61.2 ML
LEFT VENTRICLE MASS INDEX: 76 G/M2
LEFT VENTRICLE SYSTOLIC VOLUME INDEX: 11.9 ML/M2
LEFT VENTRICLE SYSTOLIC VOLUME: 22.1 ML
LEFT VENTRICULAR INTERNAL DIMENSION IN DIASTOLE: 3.78 CM (ref 3.5–6)
LEFT VENTRICULAR MASS: 139.91 G
LV LATERAL E/E' RATIO: 12.09 M/S
LV SEPTAL E/E' RATIO: 16.63 M/S
LVOT MG: 2 MMHG
LVOT MV: 0.6 CM/S
LYMPHOCYTES # BLD AUTO: 1.82 X10(3)/MCL (ref 0.6–4.6)
LYMPHOCYTES NFR BLD AUTO: 21.8 %
MCH RBC QN AUTO: 32.6 PG (ref 27–31)
MCHC RBC AUTO-ENTMCNC: 32.9 G/DL (ref 33–36)
MCV RBC AUTO: 99.1 FL (ref 80–94)
MONOCYTES # BLD AUTO: 0.96 X10(3)/MCL (ref 0.1–1.3)
MONOCYTES NFR BLD AUTO: 11.5 %
MV MEAN GRADIENT: 3 MMHG
MV PEAK E VEL: 1.33 M/S
MV PEAK GRADIENT: 10 MMHG
MV VALVE AREA BY CONTINUITY EQUATION: 1.64 CM2
NEUTROPHILS # BLD AUTO: 5.06 X10(3)/MCL (ref 2.1–9.2)
NEUTROPHILS NFR BLD AUTO: 60.9 %
NRBC BLD AUTO-RTO: 0 %
OHS LV EJECTION FRACTION SIMPSONS BIPLANE MOD: 7 %
PISA TR MAX VEL: 2.41 M/S
PLATELET # BLD AUTO: 152 X10(3)/MCL (ref 130–400)
PMV BLD AUTO: 9.3 FL (ref 7.4–10.4)
POTASSIUM SERPL-SCNC: 4.2 MMOL/L (ref 3.5–5.1)
PV PEAK VELOCITY: 0.75 CM/S
RA PRESSURE: 8 MMHG
RBC # BLD AUTO: 4.45 X10(6)/MCL (ref 4.2–5.4)
RIGHT VENTRICULAR END-DIASTOLIC DIMENSION: 3.89 CM
SODIUM SERPL-SCNC: 139 MMOL/L (ref 132–146)
SODIUM UR-SCNC: 50 MMOL/L
TDI LATERAL: 0.11 M/S
TDI SEPTAL: 0.08 M/S
TDI: 0.1 M/S
TR MAX PG: 23 MMHG
TRICUSPID ANNULAR PLANE SYSTOLIC EXCURSION: 1.72 CM
TV REST PULMONARY ARTERY PRESSURE: 31 MMHG
UUN UR-MCNC: 887 MG/DL
WBC # SPEC AUTO: 8.3 X10(3)/MCL (ref 4.5–11.5)

## 2023-04-19 PROCEDURE — P9047 ALBUMIN (HUMAN), 25%, 50ML: HCPCS | Mod: JZ,JG | Performed by: INTERNAL MEDICINE

## 2023-04-19 PROCEDURE — 21400001 HC TELEMETRY ROOM

## 2023-04-19 PROCEDURE — 85025 COMPLETE CBC W/AUTO DIFF WBC: CPT | Performed by: INTERNAL MEDICINE

## 2023-04-19 PROCEDURE — 84300 ASSAY OF URINE SODIUM: CPT | Performed by: INTERNAL MEDICINE

## 2023-04-19 PROCEDURE — 63600175 PHARM REV CODE 636 W HCPCS: Mod: JZ,JG | Performed by: INTERNAL MEDICINE

## 2023-04-19 PROCEDURE — 84520 ASSAY OF UREA NITROGEN: CPT | Performed by: INTERNAL MEDICINE

## 2023-04-19 PROCEDURE — 25000003 PHARM REV CODE 250: Performed by: INTERNAL MEDICINE

## 2023-04-19 PROCEDURE — 82570 ASSAY OF URINE CREATININE: CPT | Performed by: INTERNAL MEDICINE

## 2023-04-19 PROCEDURE — 80048 BASIC METABOLIC PNL TOTAL CA: CPT | Performed by: INTERNAL MEDICINE

## 2023-04-19 RX ORDER — METOPROLOL TARTRATE 25 MG/1
12.5 TABLET ORAL 2 TIMES DAILY
Status: DISCONTINUED | OUTPATIENT
Start: 2023-04-19 | End: 2023-04-22 | Stop reason: HOSPADM

## 2023-04-19 RX ORDER — DOCUSATE SODIUM 100 MG/1
100 CAPSULE, LIQUID FILLED ORAL DAILY
Status: DISCONTINUED | OUTPATIENT
Start: 2023-04-19 | End: 2023-04-22 | Stop reason: HOSPADM

## 2023-04-19 RX ORDER — ALBUMIN HUMAN 250 G/1000ML
12.5 SOLUTION INTRAVENOUS ONCE
Status: COMPLETED | OUTPATIENT
Start: 2023-04-19 | End: 2023-04-19

## 2023-04-19 RX ORDER — ACETAMINOPHEN 325 MG/1
650 TABLET ORAL EVERY 4 HOURS PRN
Status: DISCONTINUED | OUTPATIENT
Start: 2023-04-19 | End: 2023-04-22 | Stop reason: HOSPADM

## 2023-04-19 RX ORDER — ACETAMINOPHEN 325 MG/1
650 TABLET ORAL EVERY 8 HOURS PRN
Status: DISCONTINUED | OUTPATIENT
Start: 2023-04-19 | End: 2023-04-22 | Stop reason: HOSPADM

## 2023-04-19 RX ORDER — ONDANSETRON 2 MG/ML
4 INJECTION INTRAMUSCULAR; INTRAVENOUS EVERY 8 HOURS PRN
Status: DISCONTINUED | OUTPATIENT
Start: 2023-04-19 | End: 2023-04-22 | Stop reason: HOSPADM

## 2023-04-19 RX ORDER — ATORVASTATIN CALCIUM 10 MG/1
20 TABLET, FILM COATED ORAL DAILY
Status: DISCONTINUED | OUTPATIENT
Start: 2023-04-19 | End: 2023-04-22 | Stop reason: HOSPADM

## 2023-04-19 RX ORDER — FUROSEMIDE 10 MG/ML
40 INJECTION INTRAMUSCULAR; INTRAVENOUS
Status: DISCONTINUED | OUTPATIENT
Start: 2023-04-19 | End: 2023-04-22 | Stop reason: HOSPADM

## 2023-04-19 RX ADMIN — LEVOTHYROXINE SODIUM 75 MCG: 25 TABLET ORAL at 05:04

## 2023-04-19 RX ADMIN — APIXABAN 5 MG: 5 TABLET, FILM COATED ORAL at 08:04

## 2023-04-19 RX ADMIN — ATORVASTATIN CALCIUM 20 MG: 10 TABLET, FILM COATED ORAL at 05:04

## 2023-04-19 RX ADMIN — DOCUSATE SODIUM 100 MG: 100 CAPSULE, LIQUID FILLED ORAL at 05:04

## 2023-04-19 RX ADMIN — FUROSEMIDE 40 MG: 10 INJECTION, SOLUTION INTRAMUSCULAR; INTRAVENOUS at 05:04

## 2023-04-19 RX ADMIN — ALBUMIN (HUMAN) 12.5 G: 12.5 SOLUTION INTRAVENOUS at 05:04

## 2023-04-19 RX ADMIN — METOPROLOL TARTRATE 12.5 MG: 25 TABLET, FILM COATED ORAL at 08:04

## 2023-04-19 NOTE — ED NOTES
Assumed care of pt from [facility], states she was told to come to ED r/t abn kidney function and worsening chf seen by pcp on labs drawn yesterday. Pt denies sob at home, denies cp, states placed on o2 r/t low st on arrival. States she has gained approx 20lb in past couple weeks r/t fluid, recently changed from lasix to bumex at home by pcp to try and maintain kidney function. Pt has family at , denies pain at this time, paced on monitor awaiting room. Nadn at this time wctm.

## 2023-04-19 NOTE — ED PROVIDER NOTES
Encounter Date: 4/18/2023    SCRIBE #1 NOTE: I, Jes Almanzar, am scribing for, and in the presence of,  Antonio Paula MD. I have scribed the following portions of the note - Other sections scribed: HPI, ROS, PE.     History     Chief Complaint   Patient presents with    Abnormal Lab     Pt presents to the ed with complaints of elevated BNP/BUN/Cr levels. Reports pcp referred patient for diuresis. Reports weakness and wheezing with exertion. Gained 20+ lbs since January. Hx of CHF     89 year old female with a hx of CAD, CVA, hypothyroidism, CHF, and Afib on blood thinners presents to the ED for elevated BUN and creatinine after lab work that was done yesterday. The patient's family at the bedside reports that she has been living at [facility] since January and was retaining 20+ lbs of fluids. For the past week, the patient has been taking the highest dosage of lasix and was recently switched to bumex. She does not use oxygen at home. She has an echocardiogram on Thursday. Her cardiologist is Essence and her PCP is Isabella Miller.     The history is provided by the patient and a relative. No  was used.   Illness   The current episode started yesterday. The problem occurs continuously. The problem has been unchanged. Pertinent negatives include no fever, no abdominal pain, no constipation, no diarrhea, no nausea, no vomiting, no congestion, no ear pain, no headaches, no cough, no shortness of breath, no wheezing, no rash and no discharge. Services received include medications given. Recently, medical care has been given by the PCP.       Review of patient's allergies indicates:   Allergen Reactions    Sulfacetamide      Past Medical History:   Diagnosis Date    CAD (coronary artery disease)     Diastolic dysfunction     Hearing loss 6/23/2022    History of CVA (cerebrovascular accident) 6/23/2022    Hypothyroidism 6/23/2022    Paroxysmal atrial fibrillation     Primary  hypertension 6/23/2022     Past Surgical History:   Procedure Laterality Date    EXCISIONAL HEMORRHOIDECTOMY      EYE SURGERY  1990s    cataract    HYSTERECTOMY      KNEE SURGERY Right     right knee replacement    left cea      left shoulder repair      right cea       Family History   Problem Relation Age of Onset    Multiple sclerosis Sister      Social History     Tobacco Use    Smoking status: Never    Smokeless tobacco: Never   Substance Use Topics    Alcohol use: Never    Drug use: Never     Review of Systems   Constitutional:  Negative for chills and fever.        Elevated BUN and creatinine    HENT:  Negative for congestion and ear pain.    Eyes:  Negative for discharge.   Respiratory:  Negative for cough, shortness of breath and wheezing.    Cardiovascular:  Positive for leg swelling. Negative for chest pain.   Gastrointestinal:  Negative for abdominal pain, constipation, diarrhea, nausea and vomiting.   Genitourinary:  Negative for dysuria, flank pain and frequency.   Musculoskeletal:  Negative for back pain and joint swelling.   Skin:  Negative for rash.   Neurological:  Negative for dizziness, weakness and headaches.   Psychiatric/Behavioral:  Negative for agitation, confusion and hallucinations.      Physical Exam     Initial Vitals [04/18/23 1531]   BP Pulse Resp Temp SpO2   132/84 94 18 98.4 °F (36.9 °C) 97 %      MAP       --         Physical Exam    Nursing note and vitals reviewed.  Constitutional: She appears well-developed and well-nourished.   HENT:   Head: Normocephalic and atraumatic.   Eyes: EOM are normal. Pupils are equal, round, and reactive to light.   Neck:   Normal range of motion.  Cardiovascular:  Normal rate, normal heart sounds and intact distal pulses.           No murmur heard.  Irregular rhythm.    Pulmonary/Chest: Breath sounds normal. No respiratory distress. She has no wheezes. She has no rales.   Abdominal: Abdomen is soft. She exhibits no distension. There is no abdominal  tenderness. There is no rebound.   Musculoskeletal:         General: No tenderness. Normal range of motion.      Cervical back: Normal range of motion.      Right lower le+ Edema present.      Left lower le+ Edema present.     Neurological: She is alert. She has normal strength. No cranial nerve deficit. GCS score is 15. GCS eye subscore is 4. GCS verbal subscore is 5. GCS motor subscore is 6.   Skin: Skin is warm and dry. Capillary refill takes less than 2 seconds. No rash noted. No erythema.   Psychiatric: She has a normal mood and affect.       ED Course   Procedures  Labs Reviewed   B-TYPE NATRIURETIC PEPTIDE - Abnormal; Notable for the following components:       Result Value    Natriuretic Peptide 174.9 (*)     All other components within normal limits   COMPREHENSIVE METABOLIC PANEL - Abnormal; Notable for the following components:    Blood Urea Nitrogen 34.6 (*)     Creatinine 1.43 (*)     All other components within normal limits   CBC WITH DIFFERENTIAL - Abnormal; Notable for the following components:    Hgb 16.4 (*)     Hct 50.5 (*)     .0 (*)     MCH 32.5 (*)     MCHC 32.5 (*)     All other components within normal limits   BASIC METABOLIC PANEL - Abnormal; Notable for the following components:    Blood Urea Nitrogen 31.6 (*)     Creatinine 1.14 (*)     All other components within normal limits   CBC WITH DIFFERENTIAL - Abnormal; Notable for the following components:    MCV 99.1 (*)     MCH 32.6 (*)     MCHC 32.9 (*)     All other components within normal limits   URINALYSIS, REFLEX TO URINE CULTURE - Normal   URINALYSIS, MICROSCOPIC - Normal   CBC W/ AUTO DIFFERENTIAL    Narrative:     The following orders were created for panel order CBC auto differential.  Procedure                               Abnormality         Status                     ---------                               -----------         ------                     CBC with Differential[[id number]]        Abnormal             Final result                 Please view results for these tests on the individual orders.   CBC W/ AUTO DIFFERENTIAL    Narrative:     The following orders were created for panel order CBC Auto Differential.  Procedure                               Abnormality         Status                     ---------                               -----------         ------                     CBC with Differential[[id number]]        Abnormal            Final result                 Please view results for these tests on the individual orders.     EKG Readings: (Independently Interpreted)   Initial Reading: No STEMI. Rhythm: Atrial Fibrillation. Heart Rate: 94. Ectopy: No Ectopy. Conduction: RBBB (incomplete). ST Segments: Normal ST Segments. T Waves: Normal. Axis: Left Axis Deviation. Clinical Impression: Normal Sinus Rhythm and Atrial Fibrillation   Performed at 15:45, low voltage QRS   ECG Results              EKG 12-lead (Final result)  Result time 04/18/23 18:00:31      Final result by Interface, Lab In [facility] (04/18/23 18:00:31)                   Narrative:    Test Reason : I50.9,    Vent. Rate : 094 BPM     Atrial Rate : 000 BPM     P-R Int : 000 ms          QRS Dur : 114 ms      QT Int : 388 ms       P-R-T Axes : 000 -37 027 degrees     QTc Int : 485 ms    Atrial fibrillation  Left axis deviation  Low voltage QRS  Incomplete right bundle branch block  Abnormal ECG  No previous ECGs available  Confirmed by Gianni Carroll MD ([id number]) on 4/18/2023 6:00:23 PM    Referred By:             Confirmed By:Gianni Carroll MD                                     EKG 12-LEAD (Final result)  Result time 04/21/23 15:38:26      Final result by Unknown User (04/21/23 15:38:26)                                      Imaging Results              US Retroperitoneal Complete (Final result)  Result time 04/19/23 11:16:32      Final result by Jalil Sandoval MD (04/19/23 11:16:32)                   Impression:      Limited assessment of the left  kidney but grossly unremarkable.    No other abnormalities identified      Electronically signed by: Jalil Sandoval  Date:    04/19/2023  Time:    11:16               Narrative:    EXAMINATION:  US RETROPERITONEAL COMPLETE    CLINICAL HISTORY:  zelda;, .    TECHNIQUE:  Transverse and longitudinal images of the kidneys  and bladder were obtained.    COMPARISON:  None    FINDINGS:  Right Kidney:    Length: 9.9 x 4.4 x 5.4 cm    Appearance: Normal echogenicity.    Collecting system: No hydronephrosis    Stones: None    Cyst/Mass: None    Left Kidney: Left kidney was not optimally assessed due to limited acoustic windows and some gas    Length: 7.7 x 3.3 x 3.2 cm    Appearance: Normal echogenicity.    Collecting system: No hydronephrosis    Stones: None    Cyst/Mass: None    Bladder:    Normal    Vessels:    Visualized portions of the IVC and aorta have a normal grayscale appearance.                                       X-Ray Chest 1 View (Final result)  Result time 04/18/23 16:47:15      Final result by Isreal Alcantar MD (04/18/23 16:47:15)                   Narrative:    EXAMINATION  XR CHEST 1 VIEW    CLINICAL HISTORY  Heart failure, unspecified    TECHNIQUE  A total of 1 frontal image(s) of the chest.    COMPARISON  10 April 2023    FINDINGS  Lines/tubes/devices: None visualized    The cardiac silhouette and central vascular structures are unchanged.  The trachea is midline. No new or worsening consolidation is identified. There is no enlarging pleural effusion or convincing pneumothorax.    Regional osseous structures and extrathoracic soft tissues are similar.    IMPRESSION  No evidence of acute or worsened intrathoracic process.      Electronically signed by: Isreal Alcantar  Date:    04/18/2023  Time:    16:47                                  X-Rays:   Independently Interpreted Readings:   Chest X-Ray: No infiltrates.  No acute abnormalities.   Medications   enalaprilat injection 1.25 mg (1.25 mg Intravenous  Given 4/18/23 2001)   albumin human 25% bottle 12.5 g (0 g Intravenous Stopped 4/19/23 1826)     Medical Decision Making  Problems Addressed:  LEONOR (acute kidney injury): acute illness or injury that poses a threat to life or bodily functions  CHF (congestive heart failure): acute illness or injury that poses a threat to life or bodily functions    Amount and/or Complexity of Data Reviewed  Labs: ordered.  Radiology: ordered and independent interpretation performed.  ECG/medicine tests: ordered and independent interpretation performed.    Risk  Parenteral controlled substances.  Decision regarding hospitalization.       Medical Decision Making:   History:   I obtained history from: someone other than patient and EMS provider.       <> Summary of History: Pt presents to the ED for elevated BUN and creatinine after lab work that was done yesterday. The patient's family at the bedside reports that she has been living at [facility] since January and was retaining 20+ lbs of fluids. For the past week, the patient has been taking the highest dosage of lasix and was recently switched to bumex. She does not use oxygen at home. She has an echocardiogram on Thursday. Her cardiologist is Essence and her PCP is Isabella Miller.     Old Medical Records: I decided to obtain old medical records.  Old Records Summarized: records from previous admission(s) and records from clinic visits.       <> Summary of Records: Pt has hx of afib, recently started on bumex  Initial Assessment:   Leg swelling, SOB  Differential Diagnosis:   Judging by the patient's chief complaint and pertinent history, the patient has the following possible differential diagnoses, including but not limited to the following.  Some of these are deemed to be lower likelihood and some more likely based on my physical exam and history combined with possible lab work and/or imaging studies.   Please see the pertinent studies, and refer to the HPI.   Some of these diagnoses will take further evaluation to fully rule out, perhaps as an outpatient and the patient was encouraged to follow up when discharged for more comprehensive evaluation.    ACS, pneumonia, congestive heart failure, asthma, COPD, pleural effusion, pulmonary edema, electrolyte abnormalities  Independently Interpreted Test(s):   I have ordered and independently interpreted X-rays - see prior notes.  I have ordered and independently interpreted EKG Reading(s) - see prior notes  Clinical Tests:   Lab Tests: Ordered and Reviewed  Radiological Study: Ordered and Reviewed  Medical Tests: Reviewed and Ordered  ED Management:  Patient is a 89-year-old female sent to the emergency department for abnormal lab work, worsening CHF, found to have LEONOR.  Labs and imaging as noted.  Given additional dose of Lasix due to increased volume retention.  She reports 20 lb weight gain, bilateral lower extremity edema. Results discussed with Cardiology, Medicine, who will admit. Answered all questions at this time.  Patient/family verbalized understanding and agreed to plan.   Other:   I have discussed this case with another health care provider.        Scribe Attestation:   Scribe #1: I performed the above scribed service and the documentation accurately describes the services I performed. I attest to the accuracy of the note.    Attending Attestation:           Physician Attestation for Scribe:  Physician Attestation Statement for Scribe #1: I, Antonio Paula MD, reviewed documentation, as scribed by Jes Almanzar in my presence, and it is both accurate and complete.           ED Course as of 05/14/23 1153   Tue Apr 18, 2023   1628 Medicine will admit  [MM]      ED Course User Index  [MM] Jes Almanzar                 Clinical Impression:   Final diagnoses:  [I50.9] CHF (congestive heart failure)  [N17.9] LEONOR (acute kidney injury)        ED Disposition Condition    Admit Stable                Antonio Paula MD  05/14/23  [id number]

## 2023-04-19 NOTE — H&P
[facility]  Hospital Medicine History & Physical Examination       Patient Name: Shamika Pleitez  MRN: [id number]  Patient Class: IP- Inpatient   Admission Date: 04/19/2023   Admitting Service: Hospital Medicine   Length of Stay: 1  Attending Physician: Dr. Noonan  Primary Care Provider: Isabella Miller MD  Face-to-Face encounter date: 04/19/2023  Code Status: Full  Chief Complaint: Abnormal Lab (Pt presents to the ed with complaints of elevated BNP/BUN/Cr levels. Reports pcp referred patient for diuresis. Reports weakness and wheezing with exertion. Gained 20+ lbs since January. Hx of CHF)    Present at Bedside: Family  Source of Information: Patient. Medical Records      HISTORY OF PRESENT ILLNESS:   Shamika Pleitez is a 89 y.o. female with a PMHx of HTN, CAD, PAF on Eliquis, history of CVA, hypothyroidism, venous insufficiency, VHD who presented to [facility] on 4/18/2023 at the direction of her PCP after outpatient labs demonstrated an elevated BUN and creatinine on 04/17/2023.  Patient is a resident of [facility] since January 2023.  Family reports the patient has gained 20 lb since January.  Per chart review, patient was seen by her PCP on 04/10/2023 with worsening lower extremity swelling, fatigue and wheezing despite compliance with Lasix; Lasix was discontinued and she was initiated on Bumex 1 mg b.i.d. and had an echocardiogram ordered for Thursday 04/20/2023 to evaluate for acute CHF exacerbation.    Initial ED VS include /84, HR 94, RR 18, SpO2 97%, temperature 98.4° F.  Labs notable for hemoglobin 16.4, hematocrit 50.5, BUN 34.6, creatinine 1.43, .9.  Urinalysis unremarkable.  CXR negative for acute or worsened intrathoracic process.  Renal ultrasound is pending.  Patient admitted to hospital medicine services for further medical management.    REVIEW OF SYSTEMS:   Except as documented, all other systems reviewed and negative     PAST MEDICAL  HISTORY:   HTN, CAD, PAF on Eliquis, history of CVA, hypothyroidism, venous insufficiency, VHD    PAST SURGICAL HISTORY:     Past Surgical History:   Procedure Laterality Date    EXCISIONAL HEMORRHOIDECTOMY      EYE SURGERY  1990s    cataract    HYSTERECTOMY      KNEE SURGERY Right     right knee replacement    left cea      left shoulder repair      right cea         FAMILY HISTORY:   Reviewed and negative    SOCIAL HISTORY:   Denied alcohol, tobacco or illicit drug use    ALLERGIES:   Sulfacetamide    HOME MEDICATIONS:     Prior to Admission medications    Medication Sig Start Date End Date Taking? Authorizing Provider   atorvastatin (LIPITOR) 20 MG tablet Take 1 tablet (20 mg total) by mouth once daily. 1/10/23 1/10/24 Yes Isabella Miller MD   bumetanide (BUMEX) 1 MG tablet Take 1 tablet (1 mg total) by mouth 2 (two) times a day. 4/10/23 4/9/24 Yes Isabella Miller MD   docusate sodium (COLACE) 100 MG capsule Take 1 capsule (100 mg total) by mouth Daily. 1/10/23  Yes Isabella Miller MD   ELIQUIS 5 mg Tab Take 1 tablet (5 mg total) by mouth 2 (two) times daily. 1/10/23 1/10/24 Yes Isabella Miller MD   enalapril (VASOTEC) 10 MG tablet Take 1 tablet (10 mg total) by mouth once daily. 1/10/23 1/10/24 Yes Isabella Miller MD   levothyroxine (SYNTHROID) 75 MCG tablet Take 1 tablet (75 mcg total) by mouth once daily. 1/10/23 1/10/24 Yes Isabella Miller MD   multivitamin-iron-folic acid (CENTRUM COMPLETE) Tab Take 1 tablet by mouth Daily. 1/10/23  Yes Isabella Miller MD   acetaminophen (TYLENOL) 500 MG tablet Take 1 tablet (500 mg total) by mouth every 6 (six) hours as needed for Pain. 2/24/23   Isabella Miller MD   gentamicin (GARAMYCIN) 0.3 % ophthalmic solution Place 1 drop into the left eye every 4 (four) hours. 6/18/22   Ana Rosa Kim MD   naproxen sodium (ALEVE) 220 MG tablet Take 1 tablet (220 mg total) by mouth 2 (two) times daily as needed (pain). 2/24/23    Isabella Miller MD   psyllium 0.52 gram capsule Take 1 capsule (0.52 g total) by mouth once daily. 1/10/23   Isabella Miller MD     ________________________________________________________________________  INPATIENT LIST OF MEDICATIONS     Current Facility-Administered Medications:     acetaminophen tablet 650 mg, 650 mg, Oral, Q8H PRN, Antonietta Schwartz, AGACNP-BC    acetaminophen tablet 650 mg, 650 mg, Oral, Q4H PRN, Antonietta Schwartz, AGACNP-BC    ondansetron injection 4 mg, 4 mg, Intravenous, Q8H PRN, Antonietta Schwartz, AGACNP-BC    Current Outpatient Medications:     atorvastatin (LIPITOR) 20 MG tablet, Take 1 tablet (20 mg total) by mouth once daily., Disp: 90 tablet, Rfl: 3    bumetanide (BUMEX) 1 MG tablet, Take 1 tablet (1 mg total) by mouth 2 (two) times a day., Disp: 60 tablet, Rfl: 11    docusate sodium (COLACE) 100 MG capsule, Take 1 capsule (100 mg total) by mouth Daily., Disp: 30 capsule, Rfl: 11    ELIQUIS 5 mg Tab, Take 1 tablet (5 mg total) by mouth 2 (two) times daily., Disp: 180 tablet, Rfl: 3    enalapril (VASOTEC) 10 MG tablet, Take 1 tablet (10 mg total) by mouth once daily., Disp: 90 tablet, Rfl: 3    levothyroxine (SYNTHROID) 75 MCG tablet, Take 1 tablet (75 mcg total) by mouth once daily., Disp: 90 tablet, Rfl: 3    multivitamin-iron-folic acid (CENTRUM COMPLETE) Tab, Take 1 tablet by mouth Daily., Disp: 30 tablet, Rfl: 11    acetaminophen (TYLENOL) 500 MG tablet, Take 1 tablet (500 mg total) by mouth every 6 (six) hours as needed for Pain., Disp: 100 tablet, Rfl: 3    gentamicin (GARAMYCIN) 0.3 % ophthalmic solution, Place 1 drop into the left eye every 4 (four) hours., Disp: 5 mL, Rfl: 0    naproxen sodium (ALEVE) 220 MG tablet, Take 1 tablet (220 mg total) by mouth 2 (two) times daily as needed (pain)., Disp: 100 tablet, Rfl: 2    psyllium 0.52 gram capsule, Take 1 capsule (0.52 g total) by mouth once daily., Disp: 30 capsule, Rfl: 11    Scheduled Meds:  Continuous Infusions:  PRN  Meds:.acetaminophen, acetaminophen, ondansetron    PHYSICAL EXAM:     VITAL SIGNS: 24 HRS MIN & MAX LAST   Temp  Min: 97.5 °F (36.4 °C)  Max: 98.7 °F (37.1 °C) 97.5 °F (36.4 °C)   BP  Min: 95/57  Max: 190/140 (!) 95/57     Pulse  Min: 79  Max: 94  82   Resp  Min: 18  Max: 27 18   SpO2  Min: 92 %  Max: 98 % (!) 92 %         General appearance: Well-developed female in no apparent distress.  HENT: Atraumatic head. Moist mucous membranes of oral cavity.  Eyes: Normal extraocular movements.   Neck: Supple.   Lungs: Clear to auscultation bilaterally.   Heart: Regular rate and rhythm. S1 and S2 present. 1+ BLE pedal edema.  Abdomen: Soft, non-distended, non-tender.  Extremities: No cyanosis, clubbing  Skin: No Rash.   Neuro: Motor and sensory exams grossly intact.   Psych/mental status: Appropriate mood and affect. Responds appropriately to questions.     LABS AND IMAGING:     Recent Labs   Lab 04/18/23  1623 04/19/23  0627   WBC 9.5 8.3   RBC 5.05 4.45   HGB 16.4* 14.5   HCT 50.5* 44.1   .0* 99.1*   MCH 32.5* 32.6*   MCHC 32.5* 32.9*   RDW 13.2 13.1    152   MPV 9.6 9.3       Recent Labs   Lab 04/17/23  1349 04/18/23  1623 04/19/23  0627    137 139   K 5.2* 4.5 4.2   CO2 35* 26 29   BUN 26.5* 34.6* 31.6*   CREATININE 1.46* 1.43* 1.14*   CALCIUM 9.6 9.3 8.6   ALBUMIN  --  3.8  --    ALKPHOS  --  121  --    ALT  --  15  --    AST  --  26  --    BILITOT  --  1.1  --        Microbiology Results (last 7 days)       ** No results found for the last 168 hours. **             X-Ray Chest 1 View  EXAMINATION  XR CHEST 1 VIEW    CLINICAL HISTORY  Heart failure, unspecified    TECHNIQUE  A total of 1 frontal image(s) of the chest.    COMPARISON  10 April 2023    FINDINGS  Lines/tubes/devices: None visualized    The cardiac silhouette and central vascular structures are unchanged.  The trachea is midline. No new or worsening consolidation is identified. There is no enlarging pleural effusion or convincing  pneumothorax.    Regional osseous structures and extrathoracic soft tissues are similar.    IMPRESSION  No evidence of acute or worsened intrathoracic process.    Electronically signed by: Isreal Alcantar  Date:    04/18/2023  Time:    16:47        ASSESSMENT & PLAN:     Suspected acute CHF exacerbation-EF unknown  Fluid volume overload   LEONOR likely superimposed on CKD 3a  PAF on Eliquis   Essential hypertension  CAD   Hypothyroidism  History of CVA, venous insufficiency, VHD     Plan:  Cardiac monitoring   Echocardiogram ordered   Renal ultrasound is pending   Resume appropriate home medications once updated   Labs in a.m.      VTE Prophylaxis: Eliquis    Discharge Planning and Disposition: TBD    I, Antonietta Schwartz, NP have reviewed and discussed the case with Dr. Noonan.  Please see the attending MD's addendum for further assessment and plan.    Antonietta Schwartz, [facility]-BC  04/19/2023    _______________________________________________________________________________  MD Addendum:  I, Dr.Praneet Saran MD, assumed care of this patient today  For the patient encounter, I performed the substantive portion of the visit, I reviewed the NP/PA documentation, treatment plan, and medical decision making.  I had face to face time with this patient     A. History:  89-year-old female with history of heart failure with diastolic dysfunction, paroxysmal AFib on Eliquis hypothyroidism admitted for evaluation of progressive worsening of her fatigue, shortness of breath on exertion, weight gain.  Patient stated initially her primary doctor started on Lasix which was later discontinued and initiated on Bumex 1 mg b.i.d. the patient is compliant to medications patient continues to have worsening symptoms with weight gain.  She denied eating high salt foods.     ED VS include /84, HR 94, RR 18, SpO2 97%, temperature 98.4° F.  Labs notable for hemoglobin 16.4, hematocrit 50.5, BUN 34.6, creatinine 1.43, .9.   Urinalysis unremarkable.  Chest x-ray with stable bilateral pleural effusions from prior.    B. Physical exam:  Patient is in no acute distress, alert, oriented, lung exam revealed bibasilar crackles with pitting pedal edema heart rate irregular rhythm, abdomen soft nontender .  C. Medical decision making:  Acute on chronic heart failure with preserved ejection fraction  LEONOR on CKD question cardiorenal  Paroxysmal AFib rate controlled on Eliquis    Echo reviewed, left ventricular ejection fraction 65% with a mild-to-moderate MR, moderate-to-severe left atrial enlargement elevated pulmonary artery systolic pressure 31 mmHg   Patient needs IV diuresis without depleting intravascular volume.  We will order IV albumin and order Lasix IV b.i.d.  Needs strict output monitoring  Monitor renal function, electrolytes  Monitor on tele   Retroperitoneal ultrasound, urine lytes  Reviewed home medications, resumed appropriate    Code status:  Full code    DVT prophylaxis: DigitalChalkquis    Critical care time spent:  35 minutes  Critical care diagnosis:  Acute heart failure exacerbation needing IV diuresis       04/19/2023

## 2023-04-19 NOTE — ED NOTES
Assumed care of pt at this time, pt sleeping in ed 29, family present at bs, awaiting room which is currently pending. Nadn, pt on monitor, denies needs at this time, wctm.

## 2023-04-19 NOTE — PLAN OF CARE
04/19/23 1308   Discharge Assessment   Assessment Type Discharge Planning Assessment   Confirmed/corrected address, phone number and insurance Yes   Confirmed Demographics Correct on Facesheet   Source of Information patient   When was your last doctors appointment? 04/24/23  (PCP: Cathryn Miller -upcoming appointment)   Communicated JANE with patient/caregiver Date not available/Unable to determine   Reason For Admission CHF   People in Home facility resident   Facility Arrived From: Resident at [facility]   Do you expect to return to your current living situation? Yes   Do you have help at home or someone to help you manage your care at home? Yes   Who are your caregiver(s) and their phone number(s)? daughter, Belem Martins [phone]   Prior to hospitilization cognitive status: Unable to Assess   Current cognitive status: Unable to Assess   Walking or Climbing Stairs ambulation difficulty, requires equipment   Mobility Management rollator   Home Accessibility wheelchair accessible   Home Layout Able to live on 1st floor   Equipment Currently Used at Home rollator   Readmission within 30 days? No   Patient currently being followed by outpatient case management? No   Do you currently have service(s) that help you manage your care at home? No   Do you take prescription medications? Yes  (fills rx with [facility] through Assisted Living)   Do you have prescription coverage? Yes   Coverage Medicare/C   Do you have any problems affording any of your prescribed medications? No   Is the patient taking medications as prescribed? yes   Who is going to help you get home at discharge? daughter   How do you get to doctors appointments? family or friend will provide   Are you on dialysis? No   Do you take coumadin? No   Discharge Plan A Assisted Living   Discharge Plan B Assisted Living   DME Needed Upon Discharge  none   Discharge Plan discussed with: Adult children   Discharge Barriers Identified None      Daughter states that patient is independent at baseline and uses rollator. Lives in Assisted Living facility.  Had [facility] in the past but not current.  Family requesting PT/OT for patient so she does not regress with walking/independence.

## 2023-04-19 NOTE — NURSING
Nurses Note -- 4 Eyes      4/19/2023   3:03 PM      Skin assessed during: Admit      [x] No Pressure Injuries Present    []Prevention Measures Documented      [] Yes- Altered Skin Integrity Present or Discovered   [] LDA Added if Not in Epic (Describe Wound)   [] New Altered Skin Integrity was Present on Admit and Documented in LDA   [] Wound Image Taken    Wound Care Consulted? No    Attending Nurse:  Rosio Watkins RN     Second RN/Staff Member:  Malia Stone RN

## 2023-04-20 LAB
ALBUMIN SERPL-MCNC: 3.1 G/DL (ref 3.4–4.8)
ALBUMIN/GLOB SERPL: 1.1 RATIO (ref 1.1–2)
ALP SERPL-CCNC: 86 UNIT/L (ref 40–150)
ALT SERPL-CCNC: 11 UNIT/L (ref 0–55)
AST SERPL-CCNC: 17 UNIT/L (ref 5–34)
BASOPHILS # BLD AUTO: 0.05 X10(3)/MCL (ref 0–0.2)
BASOPHILS NFR BLD AUTO: 0.6 %
BILIRUBIN DIRECT+TOT PNL SERPL-MCNC: 1.6 MG/DL
BUN SERPL-MCNC: 27 MG/DL (ref 9.8–20.1)
CALCIUM SERPL-MCNC: 8.7 MG/DL (ref 8.4–10.2)
CHLORIDE SERPL-SCNC: 103 MMOL/L (ref 98–111)
CO2 SERPL-SCNC: 31 MMOL/L (ref 23–31)
CREAT SERPL-MCNC: 0.99 MG/DL (ref 0.55–1.02)
EOSINOPHIL # BLD AUTO: 0.38 X10(3)/MCL (ref 0–0.9)
EOSINOPHIL NFR BLD AUTO: 4.4 %
ERYTHROCYTE [DISTWIDTH] IN BLOOD BY AUTOMATED COUNT: 13 % (ref 11.5–17)
GFR SERPLBLD CREATININE-BSD FMLA CKD-EPI: 54 MLS/MIN/1.73/M2
GLOBULIN SER-MCNC: 2.9 GM/DL (ref 2.4–3.5)
GLUCOSE SERPL-MCNC: 106 MG/DL (ref 75–121)
HCT VFR BLD AUTO: 44.7 % (ref 37–47)
HGB BLD-MCNC: 14.6 G/DL (ref 12–16)
IMM GRANULOCYTES # BLD AUTO: 0.03 X10(3)/MCL (ref 0–0.04)
IMM GRANULOCYTES NFR BLD AUTO: 0.4 %
LYMPHOCYTES # BLD AUTO: 1.98 X10(3)/MCL (ref 0.6–4.6)
LYMPHOCYTES NFR BLD AUTO: 23.2 %
MAGNESIUM SERPL-MCNC: 2.1 MG/DL (ref 1.6–2.6)
MCH RBC QN AUTO: 32.2 PG (ref 27–31)
MCHC RBC AUTO-ENTMCNC: 32.7 G/DL (ref 33–36)
MCV RBC AUTO: 98.5 FL (ref 80–94)
MONOCYTES # BLD AUTO: 0.99 X10(3)/MCL (ref 0.1–1.3)
MONOCYTES NFR BLD AUTO: 11.6 %
NEUTROPHILS # BLD AUTO: 5.11 X10(3)/MCL (ref 2.1–9.2)
NEUTROPHILS NFR BLD AUTO: 59.8 %
NRBC BLD AUTO-RTO: 0 %
PHOSPHATE SERPL-MCNC: 3 MG/DL (ref 2.3–4.7)
PLATELET # BLD AUTO: 159 X10(3)/MCL (ref 130–400)
PMV BLD AUTO: 9.5 FL (ref 7.4–10.4)
POTASSIUM SERPL-SCNC: 3.7 MMOL/L (ref 3.5–5.1)
PROT SERPL-MCNC: 6 GM/DL (ref 5.8–7.6)
RBC # BLD AUTO: 4.54 X10(6)/MCL (ref 4.2–5.4)
SODIUM SERPL-SCNC: 140 MMOL/L (ref 132–146)
TROPONIN I SERPL-MCNC: 0.03 NG/ML (ref 0–0.04)
WBC # SPEC AUTO: 8.5 X10(3)/MCL (ref 4.5–11.5)

## 2023-04-20 PROCEDURE — 85025 COMPLETE CBC W/AUTO DIFF WBC: CPT | Performed by: NURSE PRACTITIONER

## 2023-04-20 PROCEDURE — 80053 COMPREHEN METABOLIC PANEL: CPT | Performed by: NURSE PRACTITIONER

## 2023-04-20 PROCEDURE — 84484 ASSAY OF TROPONIN QUANT: CPT | Performed by: INTERNAL MEDICINE

## 2023-04-20 PROCEDURE — 25000003 PHARM REV CODE 250: Performed by: INTERNAL MEDICINE

## 2023-04-20 PROCEDURE — 63600175 PHARM REV CODE 636 W HCPCS: Performed by: INTERNAL MEDICINE

## 2023-04-20 PROCEDURE — 84100 ASSAY OF PHOSPHORUS: CPT | Performed by: INTERNAL MEDICINE

## 2023-04-20 PROCEDURE — 97162 PT EVAL MOD COMPLEX 30 MIN: CPT

## 2023-04-20 PROCEDURE — 97166 OT EVAL MOD COMPLEX 45 MIN: CPT

## 2023-04-20 PROCEDURE — 83735 ASSAY OF MAGNESIUM: CPT | Performed by: INTERNAL MEDICINE

## 2023-04-20 PROCEDURE — 25000003 PHARM REV CODE 250: Performed by: NURSE PRACTITIONER

## 2023-04-20 PROCEDURE — 21400001 HC TELEMETRY ROOM

## 2023-04-20 RX ADMIN — ATORVASTATIN CALCIUM 20 MG: 10 TABLET, FILM COATED ORAL at 08:04

## 2023-04-20 RX ADMIN — APIXABAN 5 MG: 5 TABLET, FILM COATED ORAL at 08:04

## 2023-04-20 RX ADMIN — METOPROLOL TARTRATE 12.5 MG: 25 TABLET, FILM COATED ORAL at 08:04

## 2023-04-20 RX ADMIN — FUROSEMIDE 40 MG: 10 INJECTION, SOLUTION INTRAMUSCULAR; INTRAVENOUS at 01:04

## 2023-04-20 RX ADMIN — THERA TABS 1 TABLET: TAB at 08:04

## 2023-04-20 RX ADMIN — LEVOTHYROXINE SODIUM 75 MCG: 25 TABLET ORAL at 08:04

## 2023-04-20 RX ADMIN — ACETAMINOPHEN 325MG 650 MG: 325 TABLET ORAL at 06:04

## 2023-04-20 RX ADMIN — FUROSEMIDE 40 MG: 10 INJECTION, SOLUTION INTRAMUSCULAR; INTRAVENOUS at 02:04

## 2023-04-20 NOTE — PLAN OF CARE
Problem: Occupational Therapy  Goal: Occupational Therapy Goal  Description: Goals to be met by: 5/4/23     Patient will increase functional independence with ADLs by performing:    LE Dressing with Modified Sharp.  Grooming while standing at sink with Modified Sharp.  Toileting from toilet with Modified Sharp for hygiene and clothing management.   Toilet transfer to toilet with Modified Sharp.    Outcome: Ongoing, Progressing

## 2023-04-20 NOTE — CONSULTS
Inpatient consult to Cardiology  Consult performed by: Kushal Perkins AGACNP-BC  Consult ordered by: Adriana Cota MD  Reason for consult: new onset CHF      [facility] - 6th Floor Medical Telemetry  Cardiology  Consult Note    Patient Name: Shamika Pleitez  MRN: [id number]  Admission Date: 4/18/2023  Hospital Length of Stay: 2 days  Code Status: Full Code   Attending Provider: Bernard Madison MD   Consulting Provider: Brad Brown MD  Primary Care Physician: Isabella Miller MD  Principal Problem:<principal problem not specified>    Patient information was obtained from patient, past medical records, and ER records.     Subjective:     Chief Complaint:  Consulted for new onset HF     HPI:   This is a 89-year-old female, who is known to Dr. Lowry, with a history of HTN, LA, bradycardia, hypothyroidism, atrial flutter on Eliquis, TIA.  She presented to [facility] on 04/18/2023 at the direction of her PCP after her outpatient labs demonstrated elevated BUN and creatinine.  Patient is a resident at an assisted living.  Family reported the patient has gained 20 lb since January.  She also complained of worsening lower extremity edema, fatigue, and wheezing spiked compliance with her Lasix.  Lasix was discontinued and she was initiated on Bumex b.i.d..  Echo was obtained and revealed intact EF.  [facility] has been consulted for new onset heart failure.      PMH: HTN, LA, bradycardia, hypothyroidism, atrial flutter on Eliquis, TIA  PSH:  Right artificial knee, shoulder surgery, hysterectomy, carotid surgery  Social History:  Denies EtOH, tobacco, and illicit drug use  Family History:  Noncontributory    Previous Cardiac Diagnostics:   Echo 4.19.23  The left ventricle is normal in size with normal systolic function.  The estimated ejection fraction is 65%.  Normal right ventricular size with normal right ventricular systolic function.  Mild-to-moderate mitral regurgitation.  Mild tricuspid  regurgitation.  The estimated PA systolic pressure is 31 mmHg.  Moderate to severe left atrial enlargement.  Moderate right atrial enlargement.  A diastolic pattern consistent with atrial fibrillation observed.      Review of patient's allergies indicates:   Allergen Reactions    Sulfacetamide        No current facility-administered medications on file prior to encounter.     Current Outpatient Medications on File Prior to Encounter   Medication Sig    atorvastatin (LIPITOR) 20 MG tablet Take 1 tablet (20 mg total) by mouth once daily.    bumetanide (BUMEX) 1 MG tablet Take 1 tablet (1 mg total) by mouth 2 (two) times a day.    docusate sodium (COLACE) 100 MG capsule Take 1 capsule (100 mg total) by mouth Daily.    ELIQUIS 5 mg Tab Take 1 tablet (5 mg total) by mouth 2 (two) times daily.    enalapril (VASOTEC) 10 MG tablet Take 1 tablet (10 mg total) by mouth once daily.    levothyroxine (SYNTHROID) 75 MCG tablet Take 1 tablet (75 mcg total) by mouth once daily.    multivitamin-iron-folic acid (CENTRUM COMPLETE) Tab Take 1 tablet by mouth Daily.    acetaminophen (TYLENOL) 500 MG tablet Take 1 tablet (500 mg total) by mouth every 6 (six) hours as needed for Pain.    gentamicin (GARAMYCIN) 0.3 % ophthalmic solution Place 1 drop into the left eye every 4 (four) hours.    naproxen sodium (ALEVE) 220 MG tablet Take 1 tablet (220 mg total) by mouth 2 (two) times daily as needed (pain).    psyllium 0.52 gram capsule Take 1 capsule (0.52 g total) by mouth once daily.       Review of Systems:  Review of Systems   Constitutional: Negative.    HENT: Negative.     Eyes: Negative.    Respiratory:  Positive for shortness of breath.    Cardiovascular:  Positive for leg swelling.   Gastrointestinal: Negative.    Endocrine: Negative.    Genitourinary: Negative.    Musculoskeletal: Negative.    Skin: Negative.    Allergic/Immunologic: Negative.    Neurological: Negative.    Hematological: Negative.    Psychiatric/Behavioral:  Negative.       Objective:     Vital Signs (Most Recent):  Temp: 98.4 °F (36.9 °C) (04/20/23 1115)  Pulse: 77 (04/20/23 1115)  Resp: (!) 22 (04/20/23 1115)  BP: 116/80 (04/20/23 1115)  SpO2: 95 % (04/20/23 1115)   Vital Signs (24h Range):  Temp:  [98.1 °F (36.7 °C)-99 °F (37.2 °C)] 98.4 °F (36.9 °C)  Pulse:  [] 77  Resp:  [16-22] 22  SpO2:  [95 %-97 %] 95 %  BP: (112-129)/(69-88) 116/80     Weight: 80.6 kg (177 lb 11.1 oz)  Body mass index is 32.5 kg/m².    SpO2: 95 %       No intake or output data in the 24 hours ending 04/20/23 1505    Lines/Drains/Airways       Peripheral Intravenous Line  Duration                  Peripheral IV - Single Lumen 04/18/23 2001 20 G Anterior;Right Forearm 1 day                      Significant Labs: CMP   Recent Labs   Lab 04/18/23  1623 04/19/23  0627 04/20/23  0412    139 140   K 4.5 4.2 3.7   CO2 26 29 31   BUN 34.6* 31.6* 27.0*   CREATININE 1.43* 1.14* 0.99   CALCIUM 9.3 8.6 8.7   ALBUMIN 3.8  --  3.1*   BILITOT 1.1  --  1.6*   ALKPHOS 121  --  86   AST 26  --  17   ALT 15  --  11    and CBC   Recent Labs   Lab 04/18/23  1623 04/19/23  0627 04/20/23  0412   WBC 9.5 8.3 8.5   HGB 16.4* 14.5 14.6   HCT 50.5* 44.1 44.7    152 159         Significant Imaging:   Imaging Results              US Retroperitoneal Complete (Final result)  Result time 04/19/23 11:16:32      Final result by Jalil Sandoval MD (04/19/23 11:16:32)                   Impression:      Limited assessment of the left kidney but grossly unremarkable.    No other abnormalities identified      Electronically signed by: Jalil Sandoval  Date:    04/19/2023  Time:    11:16               Narrative:    EXAMINATION:  US RETROPERITONEAL COMPLETE    CLINICAL HISTORY:  zelda;, .    TECHNIQUE:  Transverse and longitudinal images of the kidneys  and bladder were obtained.    COMPARISON:  None    FINDINGS:  Right Kidney:    Length: 9.9 x 4.4 x 5.4 cm    Appearance: Normal echogenicity.    Collecting system:  No hydronephrosis    Stones: None    Cyst/Mass: None    Left Kidney: Left kidney was not optimally assessed due to limited acoustic windows and some gas    Length: 7.7 x 3.3 x 3.2 cm    Appearance: Normal echogenicity.    Collecting system: No hydronephrosis    Stones: None    Cyst/Mass: None    Bladder:    Normal    Vessels:    Visualized portions of the IVC and aorta have a normal grayscale appearance.                                       X-Ray Chest 1 View (Final result)  Result time 04/18/23 16:47:15      Final result by Isreal Alcantar MD (04/18/23 16:47:15)                   Narrative:    EXAMINATION  XR CHEST 1 VIEW    CLINICAL HISTORY  Heart failure, unspecified    TECHNIQUE  A total of 1 frontal image(s) of the chest.    COMPARISON  10 April 2023    FINDINGS  Lines/tubes/devices: None visualized    The cardiac silhouette and central vascular structures are unchanged.  The trachea is midline. No new or worsening consolidation is identified. There is no enlarging pleural effusion or convincing pneumothorax.    Regional osseous structures and extrathoracic soft tissues are similar.    IMPRESSION  No evidence of acute or worsened intrathoracic process.      Electronically signed by: Isreal Alcantar  Date:    04/18/2023  Time:    16:47                                      EKG:        Telemetry:  Afib    Physical Exam:  Physical Exam  Constitutional:       Appearance: Normal appearance.   HENT:      Head: Atraumatic.   Eyes:      Extraocular Movements: Extraocular movements intact.      Conjunctiva/sclera: Conjunctivae normal.   Cardiovascular:      Rate and Rhythm: Normal rate. Rhythm irregular.      Pulses: Normal pulses.      Heart sounds: Normal heart sounds.   Pulmonary:      Effort: Pulmonary effort is normal.   Abdominal:      Palpations: Abdomen is soft.   Musculoskeletal:         General: Normal range of motion.      Cervical back: Neck supple.      Right lower leg: Edema present.      Left lower leg:  Edema present.   Skin:     General: Skin is warm and dry.   Neurological:      Mental Status: She is alert and oriented to person, place, and time.   Psychiatric:         Mood and Affect: Mood normal.         Behavior: Behavior normal.       Home Medications:   No current facility-administered medications on file prior to encounter.     Current Outpatient Medications on File Prior to Encounter   Medication Sig Dispense Refill    atorvastatin (LIPITOR) 20 MG tablet Take 1 tablet (20 mg total) by mouth once daily. 90 tablet 3    bumetanide (BUMEX) 1 MG tablet Take 1 tablet (1 mg total) by mouth 2 (two) times a day. 60 tablet 11    docusate sodium (COLACE) 100 MG capsule Take 1 capsule (100 mg total) by mouth Daily. 30 capsule 11    ELIQUIS 5 mg Tab Take 1 tablet (5 mg total) by mouth 2 (two) times daily. 180 tablet 3    enalapril (VASOTEC) 10 MG tablet Take 1 tablet (10 mg total) by mouth once daily. 90 tablet 3    levothyroxine (SYNTHROID) 75 MCG tablet Take 1 tablet (75 mcg total) by mouth once daily. 90 tablet 3    multivitamin-iron-folic acid (CENTRUM COMPLETE) Tab Take 1 tablet by mouth Daily. 30 tablet 11    acetaminophen (TYLENOL) 500 MG tablet Take 1 tablet (500 mg total) by mouth every 6 (six) hours as needed for Pain. 100 tablet 3    gentamicin (GARAMYCIN) 0.3 % ophthalmic solution Place 1 drop into the left eye every 4 (four) hours. 5 mL 0    naproxen sodium (ALEVE) 220 MG tablet Take 1 tablet (220 mg total) by mouth 2 (two) times daily as needed (pain). 100 tablet 2    psyllium 0.52 gram capsule Take 1 capsule (0.52 g total) by mouth once daily. 30 capsule 11       Current Inpatient Medications:    Current Facility-Administered Medications:     acetaminophen tablet 650 mg, 650 mg, Oral, Q8H PRN, ADA [place]    acetaminophen tablet 650 mg, 650 mg, Oral, Q4H PRN, ADA [place]    apixaban tablet 5 mg, 5 mg, Oral, BID, Reina Noonan MD, 5 mg at 04/20/23 0854    atorvastatin  tablet 20 mg, 20 mg, Oral, Daily, Reina Noonan MD, 20 mg at 04/20/23 0853    docusate sodium capsule 100 mg, 100 mg, Oral, Daily, Reina Noonan MD, 100 mg at 04/19/23 1700    furosemide injection 40 mg, 40 mg, Intravenous, Q12H, Reina Noonan MD, 40 mg at 04/20/23 1419    levothyroxine tablet 75 mcg, 75 mcg, Oral, Daily, Reina Noonan MD, 75 mcg at 04/20/23 0853    metoprolol tartrate (LOPRESSOR) split tablet 12.5 mg, 12.5 mg, Oral, BID, Reina Noonan MD, 12.5 mg at 04/20/23 0854    multivitamin tablet, 1 tablet, Oral, Daily, Reina Noonan MD, 1 tablet at 04/20/23 0854    ondansetron injection 4 mg, 4 mg, Intravenous, Q8H PRN, Antonietta Schwartz, [facility]-BC           Assessment:     IMPRESSION:  Newly diagnosed diastolic heart failure  LEONOR on CKD   PAF   [id number]  -On Eliquis  HTN  HLD  Hypothyroidism   TIA   Venous insufficiency  LA    PLAN:     PLAN:  Continue IV Lasix  Strict I&O/daily weight  Low sodium diet  Continue home medications    Thank you for your consult.     Brad Brown MD  Cardiology  [facility] - 6th Floor Medical Telemetry  04/20/2023 12:51 PM

## 2023-04-20 NOTE — PT/OT/SLP EVAL
Occupational Therapy  Evaluation    Name: Shamika Pleitez  MRN: [id number]  Admitting Diagnosis: Suspected acute CHF exacerbation-EF unknown, Fluid volume overload, LEONOR likely superimposed on CKD 3a, PAF on Eliquis, Essential hypertension, CAD, Hypothyroidism. History of CVA, venous insufficiency, VHD   Recent Surgery: * No surgery found *      Recommendations:     Discharge Recommendations: home with home health and family care provided   (Note: Pt's daughter present, and reported that she and her sister can stay with pt for a few days post-d/c in order to increase pt's safety)  Discharge Equipment Recommendations: none  Barriers to discharge: medical dx    Assessment:     Shamika Pleitez is a 89 y.o. female with a medical diagnosis of Suspected acute CHF exacerbation-EF unknown, Fluid volume overload, LEONOR likely superimposed on CKD 3a, PAF on Eliquis, Essential hypertension, CAD, Hypothyroidism. History of CVA, venous insufficiency, VHD. She presents with O2 desaturation x1 occasion with activity. Performance deficits affecting function: weakness, impaired endurance, impaired self care skills, impaired functional mobility, gait instability, impaired balance, decreased safety awareness, decreased ROM, impaired cardiopulmonary response to activity.      Rehab Prognosis: Good; patient would benefit from acute skilled OT services to address these deficits and reach maximum level of function.       Plan:     Patient to be seen 3 x/week, 4 x/week, 5 x/week to address the above listed problems via self-care/home management, therapeutic activities, therapeutic exercises  Plan of Care Expires: 05/04/23  Plan of Care Reviewed with: patient, daughter    Subjective     Chief Complaint: Pt denied any pain.  Patient/Family Comments/goals: Return home.    Occupational Profile:  Living Environment: Lives alone in 1st floor apt at assisted living facility with NO HERMES. Owns walk-in shower with built-in bench and GBs.  Previous  level of function: Independent with ADLs, dep A with IADLs, and pt was not driving.  Equipment Used at Home: rollator (utilized at all times)  Assistance upon Discharge: Pt's daughters. Pt's daughter present, and reported that she and her sister can stay with pt for a few days post-d/c in order to increase pt's safety.    Pain/Comfort:  Pain Rating 1: 0/10    Patients cultural, spiritual, Sikhism conflicts given the current situation: no    Objective:     Communicated with: RN prior to session. Patient found HOB elevated with telemetry, pulse ox (continuous) upon OT entry to room.    General Precautions: Standard, fall  Respiratory Status: Room air  Vital Signs: Sp02: 86-98%    Occupational Performance:    Bed Mobility:    Patient completed Supine to Sit with stand by assistance  Patient completed Scooting toward EOB with stand by assistance and with side rail    Functional Mobility/Transfers:  Patient completed Sit <> Stand Transfer with SBA-CGA and vc provided for proper hand placement and to lock rollator (during stand > sit), using rollator   Patient completed Toilet Transfer via Step Transfer technique with contact guard assistance with rollator and GB  Functional Mobility: Pt ambulated <> bathroom with CGA provided, using rollator. Pt demonstrated flexed posture.    Activities of Daily Living:  Grooming: Pt retrieved hand soap and washed hands while standing at sink with vc and CGA-SBA provided.  Lower Body Dressing: Pt donned B shoes with SBA provided while seated EOB.  Toileting: Pt performed task with min A provided, requiring assist to don pamper over buttock. +Void during task.    Cognitive/Visual Perceptual:  Cognitive/Psychosocial Skills:     -       Oriented to: Person, Time, and pt able to recall city but required education on hospital. Pt also required education on situation.   -       Follows Commands/attention:Follows one-step commands and Follows two-step commands  -       Safety  awareness/insight to disability: impaired     Physical Exam:  Sensation:    -       Intact  Upper Extremity Range of Motion:     -       Right Upper Extremity: WFL except severe deficit noted in AROM of R shoulder flex (which pt has had for years) and mod deficit noted in AROM of R ER.  -       Left Upper Extremity: WFL with only min deficit noted in AROM of L shoulder flex  Upper Extremity Strength:    -       BUEs: Grossly 4/5 except B shoulder flex and R ER=3-/5    Therapeutic Positioning  Risk for acquired pressure injuries is decreased due to ability to get to BSC/toilet with assist and intact sensation.    OT interventions performed during the course of today's session in an effort to prevent and/or reduce acquired pressure injuries: geomat was ordered for sacral protection while C    Skin assessment:  All visible skin intact.    OT recommendations for therapeutic positioning throughout hospitalization: standard pressure injury prevention measures    Patient Education:  Patient provided with verbal education regarding OT role/goals/POC and fall prevention (to call RN to assist with t/fs to increase pt's safety and decrease risk of fall).  Understanding was verbalized.     Patient left up in chair with all lines intact, call button in reach, RN notified, and pt's daughter present.    GOALS:   Multidisciplinary Problems       Occupational Therapy Goals          Problem: Occupational Therapy    Goal Priority Disciplines Outcome Interventions   Occupational Therapy Goal     OT, PT/OT Ongoing, Progressing    Description: Goals to be met by: 5/4/23     Patient will increase functional independence with ADLs by performing:    LE Dressing with Modified [place].  Grooming while standing at sink with Modified [place].  Toileting from toilet with Modified [place] for hygiene and clothing management.   Toilet transfer to toilet with Modified [place].                         History:     Past Medical  History:   Diagnosis Date    CAD (coronary artery disease)     Diastolic dysfunction     Hearing loss 6/23/2022    History of CVA (cerebrovascular accident) 6/23/2022    Hypothyroidism 6/23/2022    Paroxysmal atrial fibrillation     Primary hypertension 6/23/2022         Past Surgical History:   Procedure Laterality Date    EXCISIONAL HEMORRHOIDECTOMY      EYE SURGERY  1990s    cataract    HYSTERECTOMY      KNEE SURGERY Right     right knee replacement    left cea      left shoulder repair      right cea         Time Tracking:     OT Date of Treatment: 4/20/23  OT Start Time: 1431  OT Stop Time: 1453  OT Total Time (min): 22 min    Billable Minutes: Evaluation Mod complexity 22 mins    4/20/2023

## 2023-04-20 NOTE — PLAN OF CARE
Problem: Physical Therapy  Goal: Physical Therapy Goal  Description: Goals to be met by: 2023     Patient will increase functional independence with mobility by performin. Sit to stand transfer with Modified [place]  2. Gait  x 300 feet with Modified [place] using Rollator.     Outcome: Ongoing, Progressing

## 2023-04-20 NOTE — PT/OT/SLP EVAL
Physical Therapy Evaluation    Patient Name:  Shamika Pleitez   MRN:  [id number]    Recommendations:     Discharge Recommendations: assisted living facility   Discharge Equipment Recommendations: none   Barriers to discharge: None    Assessment:     Shamika Pleitez is a 89 y.o. female admitted with a medical diagnosis of CHF and VHD.  She presents with the following impairments/functional limitations: weakness, impaired endurance, impaired sensation, impaired functional mobility, gait instability, impaired balance. Pt demonstrates a minimal decline from her baseline, and would benefit from acute PT to facilitate safe d/c back to [place].     Rehab Prognosis: Good; patient would benefit from acute skilled PT services to address these deficits and reach maximum level of function.    Recent Surgery: * No surgery found *      Plan:     During this hospitalization, patient to be seen 5 x/week to address the identified rehab impairments via gait training, therapeutic activities, therapeutic exercises and progress toward the following goals:    Plan of Care Expires:  05/20/23    Subjective     Chief Complaint: none  Patient/Family Comments/goals: to go back to [place]  Pain/Comfort:  Pain Rating 1: 0/10    Patients cultural, spiritual, Pentecostalism conflicts given the current situation: no    Living Environment:  Pt lives in [place]. Has shower chair. Uses a rollator.   Prior to admission, patients level of function was independent with ADLs and mobility w rollator.  Equipment used at home: rollator, shower chair.   Upon discharge, patient will have assistance from [place].    Objective:     Communicated with nurse prior to session.  Patient found ambulatory in room/shaw with telemetry, pulse ox (continuous)  upon PT entry to room.    General Precautions: Standard, fall  Orthopedic Precautions:    Braces:    Respiratory Status: Room air  SpO2 95% and HR 98 after ambulating 120ft.     Exams:  Sensation:    -       Intact  RLE ROM: WNL  RLE  Strength: Deficits: 4+/5 grossly   LLE ROM: WNL  LLE Strength: Deficits: 4+/5 grossly     Functional Mobility:  Bed Mobility:     Scooting: independence  Transfers:     Sit to Stand:  contact guard assistance with 4 wheeled walker  Gait: 120ft with rollator, one standing rest break, CGA. Slow pace, forward posture.       AM-PAC 6 CLICK MOBILITY  Total Score:21       Treatment & Education:  Educated pt on importance of OOB mobility, sitting in chair during the day, and ambulating with assistance.     Patient left up in chair with all lines intact, call button in reach, and family present.    GOALS:   Multidisciplinary Problems       Physical Therapy Goals          Problem: Physical Therapy    Goal Priority Disciplines Outcome Goal Variances Interventions   Physical Therapy Goal     PT, PT/OT Ongoing, Progressing     Description: Goals to be met by: 2023     Patient will increase functional independence with mobility by performin. Sit to stand transfer with Modified [place]  2. Gait  x 300 feet with Modified [place] using Rollator.                          History:     Past Medical History:   Diagnosis Date    CAD (coronary artery disease)     Diastolic dysfunction     Hearing loss 2022    History of CVA (cerebrovascular accident) 2022    Hypothyroidism 2022    Paroxysmal atrial fibrillation     Primary hypertension 2022       Past Surgical History:   Procedure Laterality Date    EXCISIONAL HEMORRHOIDECTOMY      EYE SURGERY  1990s    cataract    HYSTERECTOMY      KNEE SURGERY Right     right knee replacement    left cea      left shoulder repair      right cea         Time Tracking:     PT Received On: 23  PT Start Time: 841     PT Stop Time: 0855  PT Total Time (min): 14 min     Billable Minutes: Evaluation 14      2023

## 2023-04-21 PROBLEM — I50.33 ACUTE ON CHRONIC DIASTOLIC HEART FAILURE: Status: ACTIVE | Noted: 2023-04-21

## 2023-04-21 LAB
ANION GAP SERPL CALC-SCNC: 10 MEQ/L
BUN SERPL-MCNC: 28.3 MG/DL (ref 9.8–20.1)
CALCIUM SERPL-MCNC: 8.9 MG/DL (ref 8.4–10.2)
CHLORIDE SERPL-SCNC: 98 MMOL/L (ref 98–111)
CO2 SERPL-SCNC: 30 MMOL/L (ref 23–31)
CREAT SERPL-MCNC: 0.96 MG/DL (ref 0.55–1.02)
CREAT/UREA NIT SERPL: 29
GFR SERPLBLD CREATININE-BSD FMLA CKD-EPI: 56 MLS/MIN/1.73/M2
GLUCOSE SERPL-MCNC: 105 MG/DL (ref 75–121)
MAGNESIUM SERPL-MCNC: 2.1 MG/DL (ref 1.6–2.6)
POTASSIUM SERPL-SCNC: 4 MMOL/L (ref 3.5–5.1)
SODIUM SERPL-SCNC: 138 MMOL/L (ref 132–146)
TROPONIN I SERPL-MCNC: 0.03 NG/ML (ref 0–0.04)

## 2023-04-21 PROCEDURE — 80048 BASIC METABOLIC PNL TOTAL CA: CPT | Performed by: INTERNAL MEDICINE

## 2023-04-21 PROCEDURE — 63600175 PHARM REV CODE 636 W HCPCS: Performed by: INTERNAL MEDICINE

## 2023-04-21 PROCEDURE — 97530 THERAPEUTIC ACTIVITIES: CPT

## 2023-04-21 PROCEDURE — 97535 SELF CARE MNGMENT TRAINING: CPT

## 2023-04-21 PROCEDURE — 97116 GAIT TRAINING THERAPY: CPT

## 2023-04-21 PROCEDURE — 25000003 PHARM REV CODE 250: Performed by: INTERNAL MEDICINE

## 2023-04-21 PROCEDURE — 21400001 HC TELEMETRY ROOM

## 2023-04-21 PROCEDURE — 83735 ASSAY OF MAGNESIUM: CPT | Performed by: INTERNAL MEDICINE

## 2023-04-21 PROCEDURE — 84484 ASSAY OF TROPONIN QUANT: CPT | Performed by: INTERNAL MEDICINE

## 2023-04-21 RX ADMIN — FUROSEMIDE 40 MG: 10 INJECTION, SOLUTION INTRAMUSCULAR; INTRAVENOUS at 03:04

## 2023-04-21 RX ADMIN — METOPROLOL TARTRATE 12.5 MG: 25 TABLET, FILM COATED ORAL at 09:04

## 2023-04-21 RX ADMIN — APIXABAN 5 MG: 5 TABLET, FILM COATED ORAL at 09:04

## 2023-04-21 RX ADMIN — LEVOTHYROXINE SODIUM 75 MCG: 25 TABLET ORAL at 09:04

## 2023-04-21 RX ADMIN — ATORVASTATIN CALCIUM 20 MG: 10 TABLET, FILM COATED ORAL at 09:04

## 2023-04-21 RX ADMIN — THERA TABS 1 TABLET: TAB at 09:04

## 2023-04-21 RX ADMIN — METOPROLOL TARTRATE 12.5 MG: 25 TABLET, FILM COATED ORAL at 08:04

## 2023-04-21 RX ADMIN — APIXABAN 5 MG: 5 TABLET, FILM COATED ORAL at 08:04

## 2023-04-21 RX ADMIN — FUROSEMIDE 40 MG: 10 INJECTION, SOLUTION INTRAMUSCULAR; INTRAVENOUS at 05:04

## 2023-04-21 NOTE — PT/OT/SLP PROGRESS
Occupational Therapy   Treatment    Name: Shamika Pleitez  MRN: [id number]  Admitting Diagnosis: Suspected acute CHF exacerbation-EF unknown, Fluid volume overload, LEONOR likely superimposed on CKD 3a, PAF on Eliquis, Essential hypertension, CAD, Hypothyroidism. History of CVA, venous insufficiency, VHD     Recommendations:     Discharge Recommendations: home with home health and family care provided   (Note: Pt's daughters plan to stay with pt for a few days post-d/c in order to increase pt's safety)  Discharge Equipment Recommendations: Possibly RW (TBD, pending progress)  Barriers to discharge: medical dx    Assessment:     Shamika Pleitez is a 89 y.o. female with a medical diagnosis of Suspected acute CHF exacerbation-EF unknown, Fluid volume overload, LEONOR likely superimposed on CKD 3a, PAF on Eliquis, Essential hypertension, CAD, Hypothyroidism. History of CVA, venous insufficiency, VHD. She presents with O2 desaturation with activity. Performance deficits affecting function are weakness, impaired endurance, impaired self care skills, impaired functional mobility, gait instability, impaired balance, decreased safety awareness, decreased ROM, impaired cardiopulmonary response to activity.     Rehab Prognosis: Good; patient would benefit from acute skilled OT services to address these deficits and reach maximum level of function.       Plan:     Patient to be seen 3 x/week, 4 x/week, 5 x/week to address the above listed problems via self-care/home management, therapeutic activities, therapeutic exercises  Plan of Care Expires: 05/04/23  Plan of Care Reviewed with: patient (daughters)    Subjective     Pain/Comfort:  Pain Rating 1: 0/10    Objective:     Communicated with: RN prior to session. Patient found up in chair with telemetry, pulse ox (continuous) upon OT entry to room.    General Precautions: Standard, fall  Respiratory Status: Room air  Vital Signs:   Sp02: 80-99%. RN notified.  HR:       Occupational Performance:     Functional Mobility/Transfers:  Patient completed Sit <> Stand Transfer with SBA and vc provided for proper hand placement and to lock rollator (during stand > sit), using rollator   Functional Mobility: Pt ambulated <> bathroom with CGA provided for safety, using rollator.     Activities of Daily Living:  Grooming: Pt performed oral hygiene task with SBA provided while seated on rollator and while standing at sink Pt also brushed hair and washed face using bath cloth with SBA provided while seated on rollator at sink.    Patient Education:  Patient and daughters provided with verbal education regarding fall prevention, Discharge/DME recommendations (with OT recommending use of RW if pt continues to demonstrate decreased safety awareness using rollator), and use of proper breathing technique. Understanding was verbalized.    Patient left up in chair with all lines intact, call button in reach, and pt's daughters present.    GOALS:   Multidisciplinary Problems       Occupational Therapy Goals          Problem: Occupational Therapy    Goal Priority Disciplines Outcome Interventions   Occupational Therapy Goal     OT, PT/OT Ongoing, Progressing    Description: Goals to be met by: 5/4/23     Patient will increase functional independence with ADLs by performing:    LE Dressing with Modified [place].  Grooming while standing at sink with Modified [place].  Toileting from toilet with Modified [place] for hygiene and clothing management.   Toilet transfer to toilet with Modified [place].                         Time Tracking:     OT Date of Treatment: 4/21/23  OT Start Time: 1106  OT Stop Time: 1123  OT Total Time (min): 17 min    Billable Minutes: Self Care/Home Management 17 mins    OT/KELI: OT     Number of KELI visits since last OT visit: 1    4/21/2023

## 2023-04-21 NOTE — PROGRESS NOTES
[facility] Hospital Medicine Progress Note        Chief Complaint: Inpatient Follow-up for  abnormal labs , HF    HPI:   Shamika Pleitez is a 89 y.o. female with a PMHx of HTN, CAD, PAF on Eliquis, history of CVA, hypothyroidism, venous insufficiency, VHD who presented to [facility] on 4/18/2023 at the direction of her PCP after outpatient labs demonstrated an elevated BUN and creatinine on 04/17/2023.  Patient is a resident of [facility] since January 2023.  Family reports the patient has gained 20 lb since January.  Per chart review, patient was seen by her PCP on 04/10/2023 with worsening lower extremity swelling, fatigue and wheezing despite compliance with Lasix; Lasix was discontinued and she was initiated on Bumex 1 mg b.i.d. and had an echocardiogram ordered for Thursday 04/20/2023 to evaluate for acute CHF exacerbation.     Initial ED VS include /84, HR 94, RR 18, SpO2 97%, temperature 98.4° F.  Labs notable for hemoglobin 16.4, hematocrit 50.5, BUN 34.6, creatinine 1.43, .9.  Urinalysis unremarkable.  CXR negative for acute or worsened intrathoracic process.  Renal ultrasound is pending.  Patient admitted to hospital medicine services for further medical management.  Interval Hx:   Continues to feel better. Leg swelling has improved but not resolved yet. Continue IV Lasix additional day. Continue PT/OT service     Objective/physical exam:  General: In no acute distress, afebrile, on RA  Chest: Clear to auscultation bilaterally  Heart: RRR, +S1, S2, no appreciable murmur  Abdomen: Soft, nontender, BS +  MSK: Warm, 1+ lower extremity/pedal edema, no clubbing or cyanosis  Neurologic: Alert and oriented x4, Cranial nerve II-XII intact, Strength 5/5 in all 4 extremities      VITAL SIGNS: 24 HRS MIN & MAX LAST   Temp  Min: 97.6 °F (36.4 °C)  Max: 99.1 °F (37.3 °C) 98.1 °F (36.7 °C)   BP  Min: 102/68  Max: 118/82 102/68   Pulse  Min: 71  Max: 91  74   Resp   Min: 16  Max: 18 16   SpO2  Min: 92 %  Max: 99 % 96 %       Recent Labs   Lab 04/18/23  1623 04/19/23  0627 04/20/23 0412   WBC 9.5 8.3 8.5   RBC 5.05 4.45 4.54   HGB 16.4* 14.5 14.6   HCT 50.5* 44.1 44.7   .0* 99.1* 98.5*   MCH 32.5* 32.6* 32.2*   MCHC 32.5* 32.9* 32.7*   RDW 13.2 13.1 13.0    152 159   MPV 9.6 9.3 9.5       Recent Labs   Lab 04/18/23  1623 04/19/23 0627 04/20/23 0412 04/21/23  0411    139 140 138   K 4.5 4.2 3.7 4.0   CO2 26 29 31 30   BUN 34.6* 31.6* 27.0* 28.3*   CREATININE 1.43* 1.14* 0.99 0.96   CALCIUM 9.3 8.6 8.7 8.9   MG  --   --  2.10 2.10   ALBUMIN 3.8  --  3.1*  --    ALKPHOS 121  --  86  --    ALT 15  --  11  --    AST 26  --  17  --    BILITOT 1.1  --  1.6*  --           Microbiology Results (last 7 days)       ** No results found for the last 168 hours. **             See below for Radiology    Scheduled Med:   apixaban  5 mg Oral BID    atorvastatin  20 mg Oral Daily    docusate sodium  100 mg Oral Daily    furosemide (LASIX) injection  40 mg Intravenous Q12H    levothyroxine  75 mcg Oral Daily    metoprolol tartrate  12.5 mg Oral BID    multivitamin  1 tablet Oral Daily        Continuous Infusions:       PRN Meds:  acetaminophen, acetaminophen, ondansetron       Assessment/Plan:  Acute on chronic diastolic congestive heart failure   Acute kidney injury - improved to normal   Paroxysmal Atrial fibrillation , on Eliquis      H/O Essential HTN, CAD, LA, Hypothyroidism , Prior h/o CVA     Plan-  Continue IV diuresis additional day given pt still appears hypervolemic on exam   Cardiology consult obtained   Troponin x 2 - neg  Echo resulted LVEF 65%, mod MR, PA pressure 31  UOP increased with IV Diuretic . Creatinine normalized    BB initiated   BP is soft. ACEi initially held due to LEONOR. Will resume once BP permits.      Continue PT/OT service      Critical care diagnosis:   HF requiring IV diuretic      Critical care interventions: Hands-on evaluation, review of  labs/radiographs/records and discussion with patient and family if present  Critical care time spent: 35 minutes    VTE prophylaxis: Eliquis     Patient condition:  Fair     Anticipated discharge and Disposition:     Return to Assisted living with Home Health     All diagnosis and differential diagnosis have been reviewed; assessment and plan has been documented; I have personally reviewed the labs and test results that are presently available; I have reviewed the patients medication list; I have reviewed the consulting providers response and recommendations. I have reviewed or attempted to review medical records based upon their availability    All of the patient's questions have been  addressed and answered. Patient's is agreeable to the above stated plan. I will continue to monitor closely and make adjustments to medical management as needed.  _____________________________________________________________________    Nutrition Status:    Radiology:  US Retroperitoneal Complete  Narrative: EXAMINATION:  US RETROPERITONEAL COMPLETE    CLINICAL HISTORY:  zelda;, .    TECHNIQUE:  Transverse and longitudinal images of the kidneys  and bladder were obtained.    COMPARISON:  None    FINDINGS:  Right Kidney:    Length: 9.9 x 4.4 x 5.4 cm    Appearance: Normal echogenicity.    Collecting system: No hydronephrosis    Stones: None    Cyst/Mass: None    Left Kidney: Left kidney was not optimally assessed due to limited acoustic windows and some gas    Length: 7.7 x 3.3 x 3.2 cm    Appearance: Normal echogenicity.    Collecting system: No hydronephrosis    Stones: None    Cyst/Mass: None    Bladder:    Normal    Vessels:    Visualized portions of the IVC and aorta have a normal grayscale appearance.  Impression: Limited assessment of the left kidney but grossly unremarkable.    No other abnormalities identified    Electronically signed by: Jalil Sandoval  Date:    04/19/2023  Time:    11:16  Echo  · The left ventricle is  normal in size with normal systolic function.  · The estimated ejection fraction is 65%.  · Normal right ventricular size with normal right ventricular systolic   function.  · Mild-to-moderate mitral regurgitation.  · Mild tricuspid regurgitation.  · The estimated PA systolic pressure is 31 mmHg.  · Moderate to severe left atrial enlargement.  · Moderate right atrial enlargement.  · A diastolic pattern consistent with atrial fibrillation observed.         Adriana Cota MD   04/21/2023

## 2023-04-21 NOTE — PROGRESS NOTES
"Inpatient Nutrition Evaluation    Admit Date: 4/18/2023   Total duration of encounter: 3 days    Nutrition Recommendation/Prescription     Continue current diet as tolerated.  Add Boost Plus daily (provides 360 kcal and 14 g protein per container)  Monitor PO intake, weight, labs.    Nutrition Assessment     Chart Review    Reason Seen: continuous nutrition monitoring CHF    Malnutrition Screening Tool Results   Have you recently lost weight without trying?: No  Have you been eating poorly because of a decreased appetite?: No   MST Score: 0     Diagnosis:  Acute on chronic diastolic congestive heart failure   Acute kidney injury - improving   Paroxysmal Atrial fibrillation , on Eliquis     Relevant Medical History: HTN, CAD, PAF on Eliquis, history of CVA, hypothyroidism, venous insufficiency, VHD    Nutrition-Related Medications: Atorvastatin, docusate sodium, furosemide, multivitamin    Nutrition-Related Labs:  4/21/2023: BUN 28.3, Gluc 105    Diet Order: Diet Cardiac  Diet Cardiac  Oral Supplement Order: none  Appetite/Oral Intake: good/50-75% of meals  Factors Affecting Nutritional Intake: none identified  Food/[employer]/Cultural Preferences: none reported  Food Allergies: none reported       Wound(s):   none noted.    Comments    4/21/2023: Pt's daughter reports good appetite/PO intake prior to admit and currently. Pt denies N/V and chewing/swallowing difficulties. Last BM documented as 4/19/2023.The daughter reports wt gain due to fluid retention (pt has CHF). Pt agreeable to chocolate ONS daily. Encouraged adequate PO intake. Will monitor.     Anthropometrics    Height: 5' 2" (157.5 cm) Height Method: Stated  Last Weight: 79.6 kg (175 lb 7.8 oz) (04/21/23 0500) Weight Method: Standard Scale  BMI (Calculated): 32.1  BMI Classification: obese grade I (BMI 30-34.9)     Ideal Body Weight (IBW), Female: 110 lb     % Ideal Body Weight, Female (lb): 169.09 %                             Usual Weight Provided By: " patient denies unintentional weight loss    Wt Readings from Last 5 Encounters:   04/21/23 79.6 kg (175 lb 7.8 oz)   04/10/23 84.4 kg (186 lb)   01/05/23 75.3 kg (166 lb)   11/17/22 73.5 kg (162 lb)   11/08/22 73.5 kg (162 lb)     Weight Change(s) Since Admission:  Admit Weight: 84.4 kg (186 lb) (04/18/23 1531)  4/21/2023: 79.6 kg    Patient Education    Not applicable.    Monitoring & Evaluation     Dietitian will monitor food and beverage intake, weight, electrolyte/renal panel, glucose/endocrine profile, and gastrointestinal profile.  Nutrition Risk/Follow-Up: low (follow-up in 5-7 days)  Patients assigned 'low nutrition risk' status do not qualify for a full nutritional assessment but will be monitored and re-evaluated in a 5-7 day time period. Please consult if re-evaluation needed sooner.

## 2023-04-21 NOTE — PLAN OF CARE
04/21/23 1019   Discharge Reassessment   Assessment Type Final Discharge Note   Discharge Plan discussed with: Patient;Adult children   Discharge Plan A Assisted Living;Home Health   Discharge Plan B Assisted Living;Home Health   DME Needed Upon Discharge  none   Post-Acute Status   Post-Acute Authorization Home Health     Notified Kena at [facility] of tomorrow's discharge. Daughters will be staying with patient to assist upon discharge. Order for home health noted. List of providers given. Patient has used [facility] in the past and would like to use them again. FOC obtained. [facility]. If patient discharges over the weekend, family will fill prescriptions locally. Daughter will transport home.

## 2023-04-21 NOTE — PROGRESS NOTES
[facility] - 6th Floor Medical Telemetry  Cardiology  Progress Note    Patient Name: Shamika Pleitez  MRN: [id number]  Admission Date: 4/18/2023  Hospital Length of Stay: 3 days  Code Status: Full Code   Attending Physician: Adriana Cota MD   Primary Care Physician: Isabella Miller MD  Expected Discharge Date:   Principal Problem:Acute on chronic diastolic heart failure    Subjective:     Brief HPI:   This is a 89-year-old female, who is known to Dr. Lowry, with a history of HTN, LA, bradycardia, hypothyroidism, atrial flutter on Eliquis, TIA.  She presented to [facility] on 04/18/2023 at the direction of her PCP after her outpatient labs demonstrated elevated BUN and creatinine.  Patient is a resident at an assisted living.  Family reported the patient has gained 20 lb since January.  She also complained of worsening lower extremity edema, fatigue, and wheezing spiked compliance with her Lasix.  Lasix was discontinued and she was initiated on Bumex b.i.d..  Echo was obtained and revealed intact EF.  [facility] has been consulted for new onset heart failure.     Hospital Course:   4.21.23: Afib/flutter with CVR on tele. VSS. I&O not accurate. Weight down 1kg. Denies CP/Palps, continued improvement of edema and SOB.    PMH: HTN, LA, bradycardia, hypothyroidism, atrial flutter on Eliquis, TIA  PSH:  Right artificial knee, shoulder surgery, hysterectomy, carotid surgery  Social History:  Denies EtOH, tobacco, and illicit drug use  Family History:  Noncontributory     Previous Cardiac Diagnostics:   Echo 4.19.23  The left ventricle is normal in size with normal systolic function.  The estimated ejection fraction is 65%.  Normal right ventricular size with normal right ventricular systolic function.  Mild-to-moderate mitral regurgitation.  Mild tricuspid regurgitation.  The estimated PA systolic pressure is 31 mmHg.  Moderate to severe left atrial enlargement.  Moderate right atrial enlargement.  A diastolic  pattern consistent with atrial fibrillation observed.        Review of Systems   Constitutional: Negative for chills and fever.   Cardiovascular:  Negative for chest pain and palpitations.   Respiratory:  Negative for shortness of breath.    Psychiatric/Behavioral:  Negative for altered mental status.          Objective:     Vital Signs (Most Recent):  Temp: 98.1 °F (36.7 °C) (04/21/23 1100)  Pulse: 74 (04/21/23 1100)  Resp: 16 (04/21/23 0441)  BP: 102/68 (04/21/23 1100)  SpO2: 96 % (04/21/23 1100) Vital Signs (24h Range):  Temp:  [97.6 °F (36.4 °C)-99.1 °F (37.3 °C)] 98.1 °F (36.7 °C)  Pulse:  [71-91] 74  Resp:  [16-18] 16  SpO2:  [92 %-99 %] 96 %  BP: (102-118)/(68-82) 102/68     Weight: 79.6 kg (175 lb 7.8 oz)  Body mass index is 32.1 kg/m².    SpO2: 96 %         Intake/Output Summary (Last 24 hours) at 4/21/2023 1423  Last data filed at 4/21/2023 0600  Gross per 24 hour   Intake 330 ml   Output 600 ml   Net -270 ml       Lines/Drains/Airways       Peripheral Intravenous Line  Duration                  Peripheral IV - Single Lumen 04/18/23 2001 20 G Anterior;Right Forearm 2 days                    Significant Labs: CMP   Recent Labs   Lab 04/20/23 0412 04/21/23 0411    138   K 3.7 4.0   CO2 31 30   BUN 27.0* 28.3*   CREATININE 0.99 0.96   CALCIUM 8.7 8.9   ALBUMIN 3.1*  --    BILITOT 1.6*  --    ALKPHOS 86  --    AST 17  --    ALT 11  --     and CBC   Recent Labs   Lab 04/20/23 0412   WBC 8.5   HGB 14.6   HCT 44.7          Telemetry:  Afib/flutter with CVR    Physical Exam:  Physical Exam  Constitutional:       Appearance: Normal appearance.   HENT:      Head: Atraumatic.   Eyes:      Extraocular Movements: Extraocular movements intact.      Conjunctiva/sclera: Conjunctivae normal.   Cardiovascular:      Rate and Rhythm: Normal rate. Rhythm irregular.      Pulses: Normal pulses.      Heart sounds: Normal heart sounds.   Pulmonary:      Effort: Pulmonary effort is normal.   Abdominal:       Palpations: Abdomen is soft.   Musculoskeletal:         General: Normal range of motion.      Cervical back: Neck supple.      Right lower leg: Edema present.      Left lower leg: Edema present.   Skin:     General: Skin is warm and dry.   Neurological:      Mental Status: She is alert and oriented to person, place, and time.   Psychiatric:         Mood and Affect: Mood normal.         Behavior: Behavior normal.       Current Inpatient Medications:    Current Facility-Administered Medications:     acetaminophen tablet 650 mg, 650 mg, Oral, Q8H PRN, ADA [place]    acetaminophen tablet 650 mg, 650 mg, Oral, Q4H PRN, ADA [place], 650 mg at 04/20/23 1808    apixaban tablet 5 mg, 5 mg, Oral, BID, Reina Noonan MD, 5 mg at 04/21/23 0901    atorvastatin tablet 20 mg, 20 mg, Oral, Daily, Reina Noonan MD, 20 mg at 04/21/23 0901    docusate sodium capsule 100 mg, 100 mg, Oral, Daily, Reina Noonan MD, 100 mg at 04/19/23 1700    furosemide injection 40 mg, 40 mg, Intravenous, Q12H, Reina Noonan MD, 40 mg at 04/21/23 0534    levothyroxine tablet 75 mcg, 75 mcg, Oral, Daily, Reina Noonan MD, 75 mcg at 04/21/23 0901    metoprolol tartrate (LOPRESSOR) split tablet 12.5 mg, 12.5 mg, Oral, BID, Reina Noonan MD, 12.5 mg at 04/21/23 0901    multivitamin tablet, 1 tablet, Oral, Daily, Reina Noonan MD, 1 tablet at 04/21/23 0901    ondansetron injection 4 mg, 4 mg, Intravenous, Q8H PRN, ADA [place]        Assessment:     IMPRESSION:  Newly diagnosed diastolic heart failure  LEONOR on CKD   PAF/flutter  -[id number]  -On Eliquis  HTN  HLD  Hypothyroidism   TIA   Venous insufficiency  LA      Plan:     PLAN:  Continue IV Lasix for now, Would continue Bumex upon DC. Can likely be switched in AM.  Strict I&O/daily weight  Low sodium diet  Continue home medications    Brad Brown MD  Cardiology  [facility] - 6th Floor Medical  Telemetry  04/21/2023

## 2023-04-21 NOTE — PT/OT/SLP PROGRESS
Physical Therapy Treatment    Patient Name:  Shamika Pleitez   MRN:  [id number]    Recommendations:     Discharge Recommendations: home health PT  Discharge Equipment Recommendations: none  Barriers to discharge: None    Assessment:     Shamika Pleitez is a 89 y.o. female admitted with a medical diagnosis of Acute on chronic diastolic heart failure.  She presents with the following impairments/functional limitations: weakness, impaired endurance, impaired sensation, impaired functional mobility, gait instability, impaired balance .    Pt continues to improve with PT, though endurance still appears below reported baseline. Pt's daughter present for session, is very attentive to her mother's needs and states she and her sister will be staying with the patient for several days while she gets acclimated to home environment. Remains appropriate for  PT to facilitate return to OF.    Rehab Prognosis: Good; patient would benefit from acute skilled PT services to address these deficits and reach maximum level of function.    Recent Surgery: * No surgery found *      Plan:     During this hospitalization, patient to be seen 5 x/week to address the identified rehab impairments via gait training, therapeutic activities, therapeutic exercises and progress toward the following goals:    Plan of Care Expires:  05/20/23    Subjective     Chief Complaint: fatigued with ambulation  Patient/Family Comments/goals: get strong enough to go home  Pain/Comfort:  Pain Rating 1: 0/10      Objective:     Communicated with RN prior to session.  Patient found up in chair with pulse ox (continuous), telemetry upon PT entry to room.     General Precautions: Standard, fall  Orthopedic Precautions:    Braces:    Respiratory Status: Room air  Blood Pressure: 102/68; O2 86% with ambulation, improved to 94% in <1 minute with seated rest on rollator.  Skin Integrity:  none taken      Functional Mobility:  Transfers:     Sit to Stand:  stand by  "assistance with rollator  Toilet Transfer: contact guard assistance with  rollator/grab bars  using  Stand Pivot  Gait: 160ft total with CGA using rollator. 1 seated rest at 110 ft due to feeling "faint". Pt did de-sat but was no SOB and returned to optimal range in several seconds.  Balance: Dynamic standing balance Fair(+) for daughter to perform pericare    Therapeutic Activities/Exercises:  STS t/f with SBA though required 3 attempts due to lack of anterior weight shift  SBA recliner>toilet. (+) BM on toilet; daughter assisted with pericare in standing  SBA for hand washing at sink, required seated rest due to fatigue  1 gait trial in hallway with CGA, required seated rest. 160ft total    Education:  Patient and daughter/s provided with verbal education regarding plan of care, role of HH PT.  Understanding was verbalized.     Patient left up in chair with call button in reach, RN notified, and daughter present..    GOALS:   Multidisciplinary Problems       Physical Therapy Goals          Problem: Physical Therapy    Goal Priority Disciplines Outcome Goal Variances Interventions   Physical Therapy Goal     PT, PT/OT Ongoing, Progressing     Description: Goals to be met by: 2023     Patient will increase functional independence with mobility by performin. Sit to stand transfer with Modified [place]  2. Gait  x 300 feet with Modified [place] using Rollator.                          Time Tracking:     PT Received On: 23  PT Start Time: 1015     PT Stop Time: 1050  PT Total Time (min): 35 min     Billable Minutes: Gait Training 15 and Therapeutic Activity 20    Treatment Type: Treatment  PT/PTA: PT     Number of PTA visits since last PT visit: 2023   "

## 2023-04-21 NOTE — PLAN OF CARE
04/21/23 0946   Medicare Message   Important Message from Medicare regarding Discharge Appeal Rights Given to patient/caregiver;Explained to patient/caregiver

## 2023-04-21 NOTE — PROGRESS NOTES
[facility] Hospital Medicine Progress Note        Chief Complaint: Inpatient Follow-up for abnormal labs , HF    HPI:   Shamika Pleitez is a 89 y.o. female with a PMHx of HTN, CAD, PAF on Eliquis, history of CVA, hypothyroidism, venous insufficiency, VHD who presented to [facility] on 4/18/2023 at the direction of her PCP after outpatient labs demonstrated an elevated BUN and creatinine on 04/17/2023.  Patient is a resident of [facility] since January 2023.  Family reports the patient has gained 20 lb since January.  Per chart review, patient was seen by her PCP on 04/10/2023 with worsening lower extremity swelling, fatigue and wheezing despite compliance with Lasix; Lasix was discontinued and she was initiated on Bumex 1 mg b.i.d. and had an echocardiogram ordered for Thursday 04/20/2023 to evaluate for acute CHF exacerbation.     Initial ED VS include /84, HR 94, RR 18, SpO2 97%, temperature 98.4° F.  Labs notable for hemoglobin 16.4, hematocrit 50.5, BUN 34.6, creatinine 1.43, .9.  Urinalysis unremarkable.  CXR negative for acute or worsened intrathoracic process.  Renal ultrasound is pending.  Patient admitted to hospital medicine services for further medical management.  Interval Hx:   Pt feels better today , SOB and leg swelling are better. Increased UOP overnight with IV Furosemide.  Creatinine normalized. Consult Cardiology given new onset diastolic CHF.    Objective/physical exam:  General: In no acute distress, afebrile, on RA  Chest: Clear to auscultation bilaterally  Heart: RRR, +S1, S2, no appreciable murmur  Abdomen: Soft, nontender, BS +  MSK: Warm, 1+ lower extremity edema, no clubbing or cyanosis  Neurologic: Alert and oriented x4, Cranial nerve II-XII intact, Strength 5/5 in all 4 extremities    VITAL SIGNS: 24 HRS MIN & MAX LAST   Temp  Min: 97.6 °F (36.4 °C)  Max: 99.1 °F (37.3 °C) 97.6 °F (36.4 °C)   BP  Min: 109/76  Max: 121/78 109/76    Pulse  Min: 71  Max: 90  89   Resp  Min: 16  Max: 22 16   SpO2  Min: 95 %  Max: 99 % 98 %       Recent Labs   Lab 04/18/23  1623 04/19/23 0627 04/20/23 0412   WBC 9.5 8.3 8.5   RBC 5.05 4.45 4.54   HGB 16.4* 14.5 14.6   HCT 50.5* 44.1 44.7   .0* 99.1* 98.5*   MCH 32.5* 32.6* 32.2*   MCHC 32.5* 32.9* 32.7*   RDW 13.2 13.1 13.0    152 159   MPV 9.6 9.3 9.5       Recent Labs   Lab 04/18/23 1623 04/19/23 0627 04/20/23 0412    139 140   K 4.5 4.2 3.7   CO2 26 29 31   BUN 34.6* 31.6* 27.0*   CREATININE 1.43* 1.14* 0.99   CALCIUM 9.3 8.6 8.7   MG  --   --  2.10   ALBUMIN 3.8  --  3.1*   ALKPHOS 121  --  86   ALT 15  --  11   AST 26  --  17   BILITOT 1.1  --  1.6*          Microbiology Results (last 7 days)       ** No results found for the last 168 hours. **             See below for Radiology    Scheduled Med:   apixaban  5 mg Oral BID    atorvastatin  20 mg Oral Daily    docusate sodium  100 mg Oral Daily    furosemide (LASIX) injection  40 mg Intravenous Q12H    levothyroxine  75 mcg Oral Daily    metoprolol tartrate  12.5 mg Oral BID    multivitamin  1 tablet Oral Daily        Continuous Infusions:       PRN Meds:  acetaminophen, acetaminophen, ondansetron       Assessment/Plan:  Acute on chronic diastolic congestive heart failure   Acute kidney injury - improving   Paroxysmal Atrial fibrillation , on Eliquis     H/O Essential HTN, CAD, LA, Hypothyroidism , Prior h/o CVA    Plan-  Continue IV diuresis additional day given pt still appears hypervolemic on exam   Consult Cardiology given new onset Heart failure   Check troponin   Echo resulted LVEF 65%, mod MR, PA pressure 31  UOP increased with IV Diuretic . Creatinine normalized     BP is soft. ACEi initially held due to LEONOR. Will resume once BP permits.     PT/OT consult     Critical care diagnosis:   HF requiring IV diuretic     Critical care interventions: Hands-on evaluation, review of labs/radiographs/records and discussion with patient  and family if present  Critical care time spent: 35 minutes    VTE prophylaxis: Eliquis     Patient condition:  Fair     Anticipated discharge and Disposition:     Pending PT/OT Eval     All diagnosis and differential diagnosis have been reviewed; assessment and plan has been documented; I have personally reviewed the labs and test results that are presently available; I have reviewed the patients medication list; I have reviewed the consulting providers response and recommendations. I have reviewed or attempted to review medical records based upon their availability    All of the patient's questions have been  addressed and answered. Patient's is agreeable to the above stated plan. I will continue to monitor closely and make adjustments to medical management as needed.  _____________________________________________________________________    Nutrition Status:    Radiology:  US Retroperitoneal Complete  Narrative: EXAMINATION:  US RETROPERITONEAL COMPLETE    CLINICAL HISTORY:  zelda;, .    TECHNIQUE:  Transverse and longitudinal images of the kidneys  and bladder were obtained.    COMPARISON:  None    FINDINGS:  Right Kidney:    Length: 9.9 x 4.4 x 5.4 cm    Appearance: Normal echogenicity.    Collecting system: No hydronephrosis    Stones: None    Cyst/Mass: None    Left Kidney: Left kidney was not optimally assessed due to limited acoustic windows and some gas    Length: 7.7 x 3.3 x 3.2 cm    Appearance: Normal echogenicity.    Collecting system: No hydronephrosis    Stones: None    Cyst/Mass: None    Bladder:    Normal    Vessels:    Visualized portions of the IVC and aorta have a normal grayscale appearance.  Impression: Limited assessment of the left kidney but grossly unremarkable.    No other abnormalities identified    Electronically signed by: Jalil Sandoval  Date:    04/19/2023  Time:    11:16  Echo  · The left ventricle is normal in size with normal systolic function.  · The estimated ejection fraction  is 65%.  · Normal right ventricular size with normal right ventricular systolic   function.  · Mild-to-moderate mitral regurgitation.  · Mild tricuspid regurgitation.  · The estimated PA systolic pressure is 31 mmHg.  · Moderate to severe left atrial enlargement.  · Moderate right atrial enlargement.  · A diastolic pattern consistent with atrial fibrillation observed.         Adriana Cota MD   04/20/2023

## 2023-04-22 VITALS
WEIGHT: 174.63 LBS | DIASTOLIC BLOOD PRESSURE: 77 MMHG | TEMPERATURE: 98 F | HEIGHT: 62 IN | RESPIRATION RATE: 18 BRPM | HEART RATE: 78 BPM | OXYGEN SATURATION: 96 % | SYSTOLIC BLOOD PRESSURE: 107 MMHG | BODY MASS INDEX: 32.14 KG/M2

## 2023-04-22 PROCEDURE — 94761 N-INVAS EAR/PLS OXIMETRY MLT: CPT

## 2023-04-22 PROCEDURE — 25000003 PHARM REV CODE 250: Performed by: INTERNAL MEDICINE

## 2023-04-22 PROCEDURE — 63600175 PHARM REV CODE 636 W HCPCS: Performed by: INTERNAL MEDICINE

## 2023-04-22 RX ORDER — BUMETANIDE 1 MG/1
1 TABLET ORAL DAILY
Qty: 30 TABLET | Refills: 0 | Status: SHIPPED | OUTPATIENT
Start: 2023-04-22 | End: 2023-11-30

## 2023-04-22 RX ORDER — METOPROLOL TARTRATE 25 MG/1
12.5 TABLET, FILM COATED ORAL 2 TIMES DAILY
Qty: 30 TABLET | Refills: 0 | Status: SHIPPED | OUTPATIENT
Start: 2023-04-22 | End: 2023-06-05 | Stop reason: ALTCHOICE

## 2023-04-22 RX ORDER — BUMETANIDE 1 MG/1
1 TABLET ORAL DAILY
Qty: 30 TABLET | Refills: 0 | OUTPATIENT
Start: 2023-04-22 | End: 2023-04-22 | Stop reason: SDUPTHER

## 2023-04-22 RX ORDER — METOPROLOL TARTRATE 25 MG/1
12.5 TABLET, FILM COATED ORAL 2 TIMES DAILY
Qty: 30 TABLET | Refills: 0 | OUTPATIENT
Start: 2023-04-22 | End: 2023-04-22 | Stop reason: SDUPTHER

## 2023-04-22 RX ADMIN — FUROSEMIDE 40 MG: 10 INJECTION, SOLUTION INTRAMUSCULAR; INTRAVENOUS at 03:04

## 2023-04-22 RX ADMIN — LEVOTHYROXINE SODIUM 75 MCG: 25 TABLET ORAL at 09:04

## 2023-04-22 RX ADMIN — THERA TABS 1 TABLET: TAB at 09:04

## 2023-04-22 RX ADMIN — METOPROLOL TARTRATE 12.5 MG: 25 TABLET, FILM COATED ORAL at 09:04

## 2023-04-22 RX ADMIN — APIXABAN 5 MG: 5 TABLET, FILM COATED ORAL at 09:04

## 2023-04-22 RX ADMIN — ATORVASTATIN CALCIUM 20 MG: 10 TABLET, FILM COATED ORAL at 09:04

## 2023-04-22 NOTE — PROGRESS NOTES
[facility] - 6th Floor Medical Telemetry  Cardiology  Progress Note    Patient Name: Shamika Pleitez  MRN: [id number]  Admission Date: 4/18/2023  Hospital Length of Stay: 4 days  Code Status: Full Code   Attending Physician: Adriana Cota MD   Primary Care Physician: Isabella Miller MD  Expected Discharge Date: 4/22/2023  Principal Problem:Acute on chronic diastolic heart failure    Subjective:     Brief HPI:   This is a 89-year-old female, who is known to Dr. Lowry, with a history of HTN, LA, bradycardia, hypothyroidism, atrial flutter on Eliquis, TIA.  She presented to [facility] on 04/18/2023 at the direction of her PCP after her outpatient labs demonstrated elevated BUN and creatinine.  Patient is a resident at an assisted living.  Family reported the patient has gained 20 lb since January.  She also complained of worsening lower extremity edema, fatigue, and wheezing spiked compliance with her Lasix.  Lasix was discontinued and she was initiated on Bumex b.i.d..  Echo was obtained and revealed intact EF.  [facility] has been consulted for new onset heart failure.     Hospital Course:   4.21.23: Afib/flutter with CVR on tele. VSS. I&O not accurate. Weight down 1kg. Denies CP/Palps, continued improvement of edema and SOB.  4.22.23: Afib/flutter with CVR on tele. VSS. I&O still not accurate. Weight down 0.4kg. Denies CP/SOB/Palps.    PMH: HTN, LA, bradycardia, hypothyroidism, atrial flutter on Eliquis, TIA  PSH:  Right artificial knee, shoulder surgery, hysterectomy, carotid surgery  Social History:  Denies EtOH, tobacco, and illicit drug use  Family History:  Noncontributory     Previous Cardiac Diagnostics:   Echo 4.19.23  The left ventricle is normal in size with normal systolic function.  The estimated ejection fraction is 65%.  Normal right ventricular size with normal right ventricular systolic function.  Mild-to-moderate mitral regurgitation.  Mild tricuspid regurgitation.  The estimated PA  systolic pressure is 31 mmHg.  Moderate to severe left atrial enlargement.  Moderate right atrial enlargement.  A diastolic pattern consistent with atrial fibrillation observed.        Review of Systems   Constitutional: Negative for chills and fever.   Cardiovascular:  Negative for chest pain and palpitations.   Respiratory:  Negative for shortness of breath.    Psychiatric/Behavioral:  Negative for altered mental status.          Objective:     Vital Signs (Most Recent):  Temp: 99.8 °F (37.7 °C) (04/21/23 1957)  Pulse: (!) 59 (04/22/23 0253)  Resp: 18 (04/21/23 1957)  BP: 104/71 (04/22/23 0253)  SpO2: (!) 94 % (04/21/23 1957) Vital Signs (24h Range):  Temp:  [98.1 °F (36.7 °C)-99.8 °F (37.7 °C)] 99.8 °F (37.7 °C)  Pulse:  [59-86] 59  Resp:  [18-20] 18  SpO2:  [94 %-96 %] 94 %  BP: (102-126)/(68-78) 104/71     Weight: 79.2 kg (174 lb 9.7 oz)  Body mass index is 31.94 kg/m².    SpO2: (!) 94 %         Intake/Output Summary (Last 24 hours) at 4/22/2023 0800  Last data filed at 4/22/2023 0031  Gross per 24 hour   Intake 720 ml   Output 735 ml   Net -15 ml         Lines/Drains/Airways       Peripheral Intravenous Line  Duration                  Peripheral IV - Single Lumen 04/18/23 2001 20 G Anterior;Right Forearm 3 days                    Significant Labs: CMP   Recent Labs   Lab 04/21/23  0411      K 4.0   CO2 30   BUN 28.3*   CREATININE 0.96   CALCIUM 8.9      and CBC   No results for input(s): WBC, HGB, HCT, PLT in the last 48 hours.      Telemetry:  Afib/flutter with CVR    Physical Exam:  Physical Exam  Constitutional:       Appearance: Normal appearance.   HENT:      Head: Atraumatic.   Eyes:      Extraocular Movements: Extraocular movements intact.      Conjunctiva/sclera: Conjunctivae normal.   Cardiovascular:      Rate and Rhythm: Normal rate. Rhythm irregular.      Pulses: Normal pulses.      Heart sounds: Normal heart sounds.   Pulmonary:      Effort: Pulmonary effort is normal.   Abdominal:       Palpations: Abdomen is soft.   Musculoskeletal:         General: Normal range of motion.      Cervical back: Neck supple.      Right lower leg: Edema present.      Left lower leg: Edema present.   Skin:     General: Skin is warm and dry.   Neurological:      Mental Status: She is alert and oriented to person, place, and time.   Psychiatric:         Mood and Affect: Mood normal.         Behavior: Behavior normal.       Current Inpatient Medications:    Current Facility-Administered Medications:     acetaminophen tablet 650 mg, 650 mg, Oral, Q8H PRN, ADA Durham    acetaminophen tablet 650 mg, 650 mg, Oral, Q4H PRN, ADA Durham, 650 mg at 04/20/23 1808    apixaban tablet 5 mg, 5 mg, Oral, BID, Reina Noonan MD, 5 mg at 04/21/23 2056    atorvastatin tablet 20 mg, 20 mg, Oral, Daily, Reina Noonan MD, 20 mg at 04/21/23 0901    docusate sodium capsule 100 mg, 100 mg, Oral, Daily, Reina Noonan MD, 100 mg at 04/19/23 1700    furosemide injection 40 mg, 40 mg, Intravenous, Q12H, Reina Noonan MD, 40 mg at 04/22/23 0309    levothyroxine tablet 75 mcg, 75 mcg, Oral, Daily, Reina Noonan MD, 75 mcg at 04/21/23 0901    metoprolol tartrate (LOPRESSOR) split tablet 12.5 mg, 12.5 mg, Oral, BID, Reina Noonan MD, 12.5 mg at 04/21/23 2056    multivitamin tablet, 1 tablet, Oral, Daily, Reina Noonan MD, 1 tablet at 04/21/23 0901    ondansetron injection 4 mg, 4 mg, Intravenous, Q8H PRN, ADA Durham        Assessment:     IMPRESSION:  Newly diagnosed diastolic heart failure  LEONOR on CKD   PAF/flutter  -[id number]  -On Eliquis  HTN  HLD  Hypothyroidism   TIA   Venous insufficiency  LA      Plan:     PLAN:  Continue Bumex upon DC.   Strict I&O/daily weight  Low sodium diet  Continue home medications  F/U with Dr. Lowry or Mayo Olson  in 7-10 days  Will sign off. Thank you for allowing us to participate in the care of this patient. Please call  us with any questions.    Kushal Perkins, [facility]-BC  Cardiology  [facility] - 6th Floor Medical Telemetry  04/22/2023

## 2023-04-22 NOTE — DISCHARGE INSTRUCTIONS
Hold Enalapril for now until follow up with Cardiology Dr. Lowry  Decrease Bumex to 1 mg one po daily for fluid control

## 2023-04-22 NOTE — CARE UPDATE
[id number] Notified Pily with [facility] that pt will dc today. Faxed pt's dc clinicals to [facility] at [phone].  Notified Tess with [facility]  that pt will dc today. Faxed pt's dc clinicals to [facility] at [phone]

## 2023-04-23 PROCEDURE — G0180 MD CERTIFICATION HHA PATIENT: HCPCS | Mod: ,,, | Performed by: STUDENT IN AN ORGANIZED HEALTH CARE EDUCATION/TRAINING PROGRAM

## 2023-04-23 PROCEDURE — G0180 PR HOME HEALTH MD CERTIFICATION: ICD-10-PCS | Mod: ,,, | Performed by: STUDENT IN AN ORGANIZED HEALTH CARE EDUCATION/TRAINING PROGRAM

## 2023-04-24 ENCOUNTER — TELEPHONE (OUTPATIENT)
Dept: ADMINISTRATIVE | Facility: CLINIC | Age: 88
End: 2023-04-24
Payer: MEDICARE

## 2023-04-24 ENCOUNTER — PATIENT MESSAGE (OUTPATIENT)
Dept: ADMINISTRATIVE | Facility: OTHER | Age: 88
End: 2023-04-24
Payer: MEDICARE

## 2023-04-24 NOTE — PHYSICIAN QUERY
PT Name: Shamika Pleitez  MR #: [id number]     DOCUMENTATION CLARIFICATION     CDS/: Antonia Palma RN CDIS           Contact information: [email or web address]    This form is a permanent document in the medical record.     Query Date: April 24, 2023    By submitting this query, we are merely seeking further clarification of documentation.  Please utilize your independent clinical judgment when addressing the question(s) below.    The Medical Record contains the following   Indicators Supporting Clinical Findings Location in Medical Record   x Heart Failure documented Newly diagnosed diastolic heart failure    Acute on chronic diastolic congestive heart failure     Suspected acute CHF exacerbation-EF unknown Cards note 4/22     HM note 4/21     H&P    x BNP .9 H&P    x EF/Echo The left ventricle is normal in size with normal systolic function.  The estimated ejection fraction is 65%.  Normal right ventricular size with normal right ventricular systolic function.  Mild-to-moderate mitral regurgitation.  Mild tricuspid regurgitation.  The estimated PA systolic pressure is 31 mmHg.  Moderate to severe left atrial enlargement.  Moderate right atrial enlargement.  A diastolic pattern consistent with atrial fibrillation observed. TTE 4/19    x Radiology findings No evidence of acute or worsened intrathoracic process. H&P     Subjective/Objective Respiratory Conditions      Recent/Current MI      Heart Transplant, LVAD     x Edema, JVD  1+ BLE pedal edema H&P     Ascites     x Diuretics/Meds furosemide injection 40 mg  Dose: 40 mg  Freq: Every 12 hours (non-standard times) Route: IV  Start: 04/19/23 1400 End: 04/22/23 1614    furosemide injection 40 mg  Dose: 40 mg  Freq: ED 1 Time Route: IV  Start: 04/18/23 2145 End: 04/18/23 2139 MAR           MAR     Other Treatment     x Other   Per chart review, patient was seen by her PCP on 04/10/2023 with worsening lower extremity swelling, fatigue and wheezing  despite compliance with Lasix; Lasix was discontinued and she was initiated on Bumex 1 mg b.i.d. and had an echocardiogram ordered for Thursday 04/20/2023 to evaluate for acute CHF exacerbation. [facility] note 4/21      Heart failure is a clinical diagnosis which includes symptomatic fluid retention, elevated intracardiac pressures, and/or the inability of the heart to deliver adequate blood flow.    Heart Failure with reduced Ejection Fraction (HFrEF) or Systolic Heart Failure (loses ability to contract normally, EF is <40%)    Heart Failure with preserved Ejection Fraction (HFpEF) or Diastolic Heart Failure (stiff ventricles, does not relax properly, EF is >50%)     Heart Failure with Combined Systolic and Diastolic Failure (stiff ventricles, does not relax properly and EF is <50%)    Mid-range or mildly reduced ejection fraction (HFmrEF) is classified as systolic heart failure.  Congestive heart failure with a recovered EF is classified as Diastolic Heart Failure.  Common clues to acute exacerbation:  Rapidly progressive symptoms (w/in 2 weeks of presentation), using IV diuretics, using supplemental O2, pulmonary edema on Xray, new or worsening pleural effusion, +JVD or other signs of volume overload, MI w/in 4 weeks, and/or BNP >500  The clinical guidelines noted are only system guidelines, and do not replace the providers clinical judgment.    Provider, please Clarify the ACUITY of the heart failure    [   ]  Acute Diastolic Heart Failure (HFpEF) - new diagnosis   [  x ]  Acute on Chronic Diastolic Heart Failure (HFpEF) - worsening of CHF signs/symptoms in preexisting CHF   [   ]  Other (please specify): ___________________________________         Please document in your progress notes daily for the duration of treatment until resolved and include in your discharge summary.    References:  American Heart Association editorial staff. (2017, May). Ejection Fraction Heart Failure Measurement. American Heart  Association. https://www.heart.org/en/health-topics/heart-failure/diagnosing-heart-failure/ejection-fraction-heart-failure-measurement#:~:text=Ejection%20fraction%20(EF)%20is%20a,pushed%20out%20with%20each%20heartbeat  ADEEL Lewis (2020, December 15). Heart failure with preserved ejection fraction: Clinical manifestations and diagnosis. [employer]. https://www.WhoseView.ie.NPC III/contents/heart-failure-with-preserved-ejection-fraction-clinical-manifestations-and-diagnosis.  ICD-10-CM/PCS Coding Clinic Third Quarter ICD-10, Effective with discharges: September 8, 2020 [facility] § Heart failure with mid-range or mildly reduced ejection fraction (2020).  ICD-10-CM/PCS Coding Clinic Third Quarter ICD-10, Effective with discharges: September 8, 2020 [facility] § Heart failure with recovered ejection fraction (2020).  Form No. 39691

## 2023-04-25 ENCOUNTER — PATIENT OUTREACH (OUTPATIENT)
Dept: ADMINISTRATIVE | Facility: CLINIC | Age: 88
End: 2023-04-25
Payer: MEDICARE

## 2023-04-25 NOTE — PROGRESS NOTES
Daughter stated patient a resident at [facility] and dependant on nurses for care and medications.

## 2023-04-27 ENCOUNTER — PATIENT MESSAGE (OUTPATIENT)
Dept: PRIMARY CARE CLINIC | Facility: CLINIC | Age: 88
End: 2023-04-27
Payer: MEDICARE

## 2023-04-27 DIAGNOSIS — I50.33 ACUTE ON CHRONIC DIASTOLIC HEART FAILURE: Primary | ICD-10-CM

## 2023-05-02 ENCOUNTER — LAB VISIT (OUTPATIENT)
Dept: LAB | Facility: HOSPITAL | Age: 88
End: 2023-05-02
Attending: STUDENT IN AN ORGANIZED HEALTH CARE EDUCATION/TRAINING PROGRAM
Payer: MEDICARE

## 2023-05-02 DIAGNOSIS — I50.33 ACUTE ON CHRONIC DIASTOLIC HEART FAILURE: ICD-10-CM

## 2023-05-02 LAB
ALBUMIN SERPL-MCNC: 3.3 G/DL (ref 3.4–4.8)
ALBUMIN/GLOB SERPL: 1.1 RATIO (ref 1.1–2)
ALP SERPL-CCNC: 110 UNIT/L (ref 40–150)
ALT SERPL-CCNC: 24 UNIT/L (ref 0–55)
AST SERPL-CCNC: 25 UNIT/L (ref 5–34)
BILIRUBIN DIRECT+TOT PNL SERPL-MCNC: 0.5 MG/DL
BUN SERPL-MCNC: 24 MG/DL (ref 9.8–20.1)
CALCIUM SERPL-MCNC: 9.3 MG/DL (ref 8.4–10.2)
CHLORIDE SERPL-SCNC: 102 MMOL/L (ref 98–111)
CO2 SERPL-SCNC: 33 MMOL/L (ref 23–31)
CREAT SERPL-MCNC: 1.11 MG/DL (ref 0.55–1.02)
GFR SERPLBLD CREATININE-BSD FMLA CKD-EPI: 47 MLS/MIN/1.73/M2
GLOBULIN SER-MCNC: 3 GM/DL (ref 2.4–3.5)
GLUCOSE SERPL-MCNC: 85 MG/DL (ref 75–121)
POTASSIUM SERPL-SCNC: 4.7 MMOL/L (ref 3.5–5.1)
PROT SERPL-MCNC: 6.3 GM/DL (ref 5.8–7.6)
SODIUM SERPL-SCNC: 140 MMOL/L (ref 132–146)

## 2023-05-02 PROCEDURE — 80053 COMPREHEN METABOLIC PANEL: CPT

## 2023-05-02 PROCEDURE — 36415 COLL VENOUS BLD VENIPUNCTURE: CPT

## 2023-05-04 ENCOUNTER — EXTERNAL HOME HEALTH (OUTPATIENT)
Dept: HOME HEALTH SERVICES | Facility: HOSPITAL | Age: 88
End: 2023-05-04
Payer: MEDICARE

## 2023-05-05 ENCOUNTER — PATIENT MESSAGE (OUTPATIENT)
Dept: PRIMARY CARE CLINIC | Facility: CLINIC | Age: 88
End: 2023-05-05
Payer: MEDICARE

## 2023-05-06 ENCOUNTER — PATIENT MESSAGE (OUTPATIENT)
Dept: PRIMARY CARE CLINIC | Facility: CLINIC | Age: 88
End: 2023-05-06
Payer: MEDICARE

## 2023-05-19 RX ORDER — ENALAPRIL MALEATE 5 MG/1
5 TABLET ORAL
COMMUNITY
Start: 2023-05-05 | End: 2023-06-05 | Stop reason: ALTCHOICE

## 2023-06-05 ENCOUNTER — PATIENT MESSAGE (OUTPATIENT)
Dept: CARDIOLOGY | Facility: CLINIC | Age: 88
End: 2023-06-05
Payer: MEDICARE

## 2023-06-05 ENCOUNTER — TELEPHONE (OUTPATIENT)
Dept: CARDIOLOGY | Facility: CLINIC | Age: 88
End: 2023-06-05
Payer: MEDICARE

## 2023-06-05 ENCOUNTER — PATIENT MESSAGE (OUTPATIENT)
Dept: PRIMARY CARE CLINIC | Facility: CLINIC | Age: 88
End: 2023-06-05
Payer: MEDICARE

## 2023-06-05 NOTE — TELEPHONE ENCOUNTER
Called regarding portal message sent to incorrect provider's office. Instructions given to reach out to Dr Lowry's office directly.

## 2023-06-19 ENCOUNTER — TELEPHONE (OUTPATIENT)
Dept: PRIMARY CARE CLINIC | Facility: CLINIC | Age: 88
End: 2023-06-19
Payer: MEDICARE

## 2023-06-19 NOTE — TELEPHONE ENCOUNTER
----- Message from April Stewart sent at 6/19/2023  9:23 AM CDT -----  Regarding: med advice  .Type:  Needs Medical Advice    Who Called:  Emma (home health)  Symptoms (please be specific):    How long has patient had these symptoms:    Pharmacy name and phone #:    Would the patient rather a call back or a response via [facility]?  Call back  Best Call Back Number:  [phone]  Additional Information:  would like to speak to nurse concerning certifying patient to home health. Please advise.

## 2023-06-22 PROCEDURE — G0179 PR HOME HEALTH MD RECERTIFICATION: ICD-10-PCS | Mod: ,,, | Performed by: STUDENT IN AN ORGANIZED HEALTH CARE EDUCATION/TRAINING PROGRAM

## 2023-06-22 PROCEDURE — G0179 MD RECERTIFICATION HHA PT: HCPCS | Mod: ,,, | Performed by: STUDENT IN AN ORGANIZED HEALTH CARE EDUCATION/TRAINING PROGRAM

## 2023-06-27 ENCOUNTER — EXTERNAL HOME HEALTH (OUTPATIENT)
Dept: HOME HEALTH SERVICES | Facility: HOSPITAL | Age: 88
End: 2023-06-27
Payer: MEDICARE

## 2023-08-08 ENCOUNTER — TELEPHONE (OUTPATIENT)
Dept: PRIMARY CARE CLINIC | Facility: CLINIC | Age: 88
End: 2023-08-08
Payer: MEDICARE

## 2023-08-22 ENCOUNTER — LAB VISIT (OUTPATIENT)
Dept: LAB | Facility: HOSPITAL | Age: 88
End: 2023-08-22
Attending: INTERNAL MEDICINE
Payer: MEDICARE

## 2023-08-22 DIAGNOSIS — I10 HYPERTENSION, UNSPECIFIED TYPE: ICD-10-CM

## 2023-08-22 DIAGNOSIS — R60.0 LOCALIZED EDEMA: Primary | ICD-10-CM

## 2023-08-22 DIAGNOSIS — E87.70 HYPERVOLEMIA, UNSPECIFIED HYPERVOLEMIA TYPE: ICD-10-CM

## 2023-08-22 LAB — BNP BLD-MCNC: 247 PG/ML

## 2023-08-22 PROCEDURE — 36415 COLL VENOUS BLD VENIPUNCTURE: CPT

## 2023-08-22 PROCEDURE — 83880 ASSAY OF NATRIURETIC PEPTIDE: CPT

## 2023-08-30 ENCOUNTER — LAB VISIT (OUTPATIENT)
Dept: LAB | Facility: HOSPITAL | Age: 88
End: 2023-08-30
Attending: NURSE PRACTITIONER
Payer: MEDICARE

## 2023-08-30 DIAGNOSIS — I10 HYPERTENSION, UNSPECIFIED TYPE: ICD-10-CM

## 2023-08-30 DIAGNOSIS — E03.9 HYPOTHYROIDISM, UNSPECIFIED TYPE: Primary | ICD-10-CM

## 2023-08-30 LAB
ALBUMIN SERPL-MCNC: 3.8 G/DL (ref 3.4–4.8)
ALBUMIN/GLOB SERPL: 1.2 RATIO (ref 1.1–2)
ALP SERPL-CCNC: 114 UNIT/L (ref 40–150)
ALT SERPL-CCNC: 15 UNIT/L (ref 0–55)
AST SERPL-CCNC: 25 UNIT/L (ref 5–34)
BILIRUB SERPL-MCNC: 1 MG/DL
BUN SERPL-MCNC: 51.4 MG/DL (ref 9.8–20.1)
CALCIUM SERPL-MCNC: 9.8 MG/DL (ref 8.4–10.2)
CHLORIDE SERPL-SCNC: 88 MMOL/L (ref 98–111)
CO2 SERPL-SCNC: 35 MMOL/L (ref 23–31)
CREAT SERPL-MCNC: 1.58 MG/DL (ref 0.55–1.02)
GFR SERPLBLD CREATININE-BSD FMLA CKD-EPI: 31 MLS/MIN/1.73/M2
GLOBULIN SER-MCNC: 3.1 GM/DL (ref 2.4–3.5)
GLUCOSE SERPL-MCNC: 68 MG/DL (ref 75–121)
POTASSIUM SERPL-SCNC: 3.4 MMOL/L (ref 3.5–5.1)
PROT SERPL-MCNC: 6.9 GM/DL (ref 5.8–7.6)
SODIUM SERPL-SCNC: 138 MMOL/L (ref 132–146)

## 2023-08-30 PROCEDURE — 36415 COLL VENOUS BLD VENIPUNCTURE: CPT

## 2023-08-30 PROCEDURE — 80053 COMPREHEN METABOLIC PANEL: CPT

## 2023-09-03 ENCOUNTER — PATIENT MESSAGE (OUTPATIENT)
Dept: PRIMARY CARE CLINIC | Facility: CLINIC | Age: 88
End: 2023-09-03
Payer: MEDICARE

## 2023-09-03 DIAGNOSIS — F33.0 MILD EPISODE OF RECURRENT MAJOR DEPRESSIVE DISORDER: Primary | ICD-10-CM

## 2023-09-05 RX ORDER — ESCITALOPRAM OXALATE 10 MG/1
10 TABLET ORAL DAILY
Qty: 30 TABLET | Refills: 11 | Status: SHIPPED | OUTPATIENT
Start: 2023-09-05 | End: 2024-09-04

## 2023-10-04 ENCOUNTER — PATIENT MESSAGE (OUTPATIENT)
Dept: PRIMARY CARE CLINIC | Facility: CLINIC | Age: 88
End: 2023-10-04
Payer: MEDICARE

## 2023-10-05 DIAGNOSIS — I48.0 PAROXYSMAL ATRIAL FIBRILLATION: ICD-10-CM

## 2023-10-05 DIAGNOSIS — E78.2 MIXED HYPERLIPIDEMIA: ICD-10-CM

## 2023-10-05 RX ORDER — ATORVASTATIN CALCIUM 20 MG/1
20 TABLET, FILM COATED ORAL DAILY
Qty: 90 TABLET | Refills: 3 | OUTPATIENT
Start: 2023-10-05 | End: 2023-10-18 | Stop reason: SDUPTHER

## 2023-10-05 RX ORDER — APIXABAN 5 MG/1
5 TABLET, FILM COATED ORAL 2 TIMES DAILY
Qty: 180 TABLET | Refills: 3 | OUTPATIENT
Start: 2023-10-05 | End: 2023-10-19 | Stop reason: SDUPTHER

## 2023-10-18 DIAGNOSIS — E78.2 MIXED HYPERLIPIDEMIA: ICD-10-CM

## 2023-10-18 RX ORDER — ATORVASTATIN CALCIUM 20 MG/1
20 TABLET, FILM COATED ORAL DAILY
Qty: 90 TABLET | Refills: 3 | Status: SHIPPED | OUTPATIENT
Start: 2023-10-18 | End: 2024-10-17

## 2023-10-18 RX ORDER — ATORVASTATIN CALCIUM 20 MG/1
20 TABLET, FILM COATED ORAL DAILY
Qty: 90 TABLET | Refills: 3 | Status: SHIPPED | OUTPATIENT
Start: 2023-10-18 | End: 2023-10-18 | Stop reason: SDUPTHER

## 2023-10-19 DIAGNOSIS — I48.0 PAROXYSMAL ATRIAL FIBRILLATION: ICD-10-CM

## 2023-10-19 RX ORDER — APIXABAN 5 MG/1
5 TABLET, FILM COATED ORAL 2 TIMES DAILY
Qty: 180 TABLET | Refills: 3 | OUTPATIENT
Start: 2023-10-19 | End: 2024-10-18

## 2023-11-06 ENCOUNTER — TELEPHONE (OUTPATIENT)
Dept: PRIMARY CARE CLINIC | Facility: CLINIC | Age: 88
End: 2023-11-06
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
## 2023-11-06 NOTE — TELEPHONE ENCOUNTER
----- Message from Iasbel Quick sent at 11/6/2023 10:15 AM CST -----  Regarding: advice  Type:  Needs Medical Advice    Who Called: Angie with assistant living  Symptoms (please be specific): recent fall   How long has patient had these symptoms:    Pharmacy name and phone #:    Would the patient rather a call back or a response via [facility]? cb  Best Call Back Number: [phone]  Additional Information: pt had a recent fall and is needing an xray on her left foot  sent to her to have it done with the portable. Please advise (fax [phone])

## 2023-11-07 DIAGNOSIS — Z86.73 HISTORY OF CVA (CEREBROVASCULAR ACCIDENT): Chronic | ICD-10-CM

## 2023-11-07 DIAGNOSIS — R29.6 FALLS FREQUENTLY: Primary | ICD-10-CM

## 2023-11-07 NOTE — TELEPHONE ENCOUNTER
[facility] came by [facility] for residents.  Due to patient increasing fall requesting pt/ ot-evaluation.  Will need current face to face   Spoke to [place] will get done on upcoming visit does not want to move up appt.

## 2023-11-14 PROCEDURE — G0180 MD CERTIFICATION HHA PATIENT: HCPCS | Mod: ,,, | Performed by: STUDENT IN AN ORGANIZED HEALTH CARE EDUCATION/TRAINING PROGRAM

## 2023-11-14 PROCEDURE — G0180 PR HOME HEALTH MD CERTIFICATION: ICD-10-PCS | Mod: ,,, | Performed by: STUDENT IN AN ORGANIZED HEALTH CARE EDUCATION/TRAINING PROGRAM

## 2023-11-15 DIAGNOSIS — R79.9 ABNORMAL FINDING OF BLOOD CHEMISTRY, UNSPECIFIED: ICD-10-CM

## 2023-11-15 DIAGNOSIS — Z00.00 MEDICARE ANNUAL WELLNESS VISIT, SUBSEQUENT: Primary | ICD-10-CM

## 2023-11-15 DIAGNOSIS — E03.9 HYPOTHYROIDISM, UNSPECIFIED TYPE: Chronic | ICD-10-CM

## 2023-11-15 DIAGNOSIS — E78.2 MIXED HYPERLIPIDEMIA: Chronic | ICD-10-CM

## 2023-11-15 DIAGNOSIS — I10 PRIMARY HYPERTENSION: Chronic | ICD-10-CM

## 2023-11-27 ENCOUNTER — LAB VISIT (OUTPATIENT)
Dept: LAB | Facility: HOSPITAL | Age: 88
End: 2023-11-27
Attending: STUDENT IN AN ORGANIZED HEALTH CARE EDUCATION/TRAINING PROGRAM
Payer: MEDICARE

## 2023-11-27 DIAGNOSIS — Z00.00 MEDICARE ANNUAL WELLNESS VISIT, SUBSEQUENT: ICD-10-CM

## 2023-11-27 DIAGNOSIS — E03.9 HYPOTHYROIDISM, UNSPECIFIED TYPE: Chronic | ICD-10-CM

## 2023-11-27 DIAGNOSIS — R79.9 ABNORMAL FINDING OF BLOOD CHEMISTRY, UNSPECIFIED: ICD-10-CM

## 2023-11-27 DIAGNOSIS — I10 PRIMARY HYPERTENSION: ICD-10-CM

## 2023-11-27 DIAGNOSIS — E78.2 MIXED HYPERLIPIDEMIA: Chronic | ICD-10-CM

## 2023-11-27 LAB
ALBUMIN SERPL-MCNC: 3.3 G/DL (ref 3.4–4.8)
ALBUMIN/GLOB SERPL: 1.2 RATIO (ref 1.1–2)
ALP SERPL-CCNC: 156 UNIT/L (ref 40–150)
ALT SERPL-CCNC: 16 UNIT/L (ref 0–55)
AMORPH URATE CRY URNS QL MICRO: ABNORMAL /UL
APPEARANCE UR: ABNORMAL
AST SERPL-CCNC: 25 UNIT/L (ref 5–34)
BACTERIA #/AREA URNS AUTO: ABNORMAL /HPF
BASOPHILS # BLD AUTO: 0.06 X10(3)/MCL
BASOPHILS NFR BLD AUTO: 0.7 %
BILIRUB SERPL-MCNC: 1.5 MG/DL
BILIRUB UR QL STRIP.AUTO: NEGATIVE
BUN SERPL-MCNC: 26.3 MG/DL (ref 9.8–20.1)
CALCIUM SERPL-MCNC: 8.7 MG/DL (ref 8.4–10.2)
CHLORIDE SERPL-SCNC: 105 MMOL/L (ref 98–111)
CHOLEST SERPL-MCNC: 127 MG/DL
CHOLEST/HDLC SERPL: 2 {RATIO} (ref 0–5)
CO2 SERPL-SCNC: 31 MMOL/L (ref 23–31)
COLOR UR AUTO: YELLOW
CREAT SERPL-MCNC: 1.04 MG/DL (ref 0.55–1.02)
EOSINOPHIL # BLD AUTO: 0.07 X10(3)/MCL (ref 0–0.9)
EOSINOPHIL NFR BLD AUTO: 0.9 %
ERYTHROCYTE [DISTWIDTH] IN BLOOD BY AUTOMATED COUNT: 13.1 % (ref 11.5–17)
EST. AVERAGE GLUCOSE BLD GHB EST-MCNC: 102.5 MG/DL
GFR SERPLBLD CREATININE-BSD FMLA CKD-EPI: 51 MLS/MIN/1.73/M2
GLOBULIN SER-MCNC: 2.7 GM/DL (ref 2.4–3.5)
GLUCOSE SERPL-MCNC: 111 MG/DL (ref 75–121)
GLUCOSE UR QL STRIP.AUTO: NORMAL
HBA1C MFR BLD: 5.2 %
HCT VFR BLD AUTO: 40.4 % (ref 37–47)
HDLC SERPL-MCNC: 58 MG/DL (ref 35–60)
HGB BLD-MCNC: 13 G/DL (ref 12–16)
IMM GRANULOCYTES # BLD AUTO: 0.03 X10(3)/MCL (ref 0–0.04)
IMM GRANULOCYTES NFR BLD AUTO: 0.4 %
KETONES UR QL STRIP.AUTO: NEGATIVE
LDLC SERPL CALC-MCNC: 55 MG/DL (ref 50–140)
LEUKOCYTE ESTERASE UR QL STRIP.AUTO: NEGATIVE
LYMPHOCYTES # BLD AUTO: 1.6 X10(3)/MCL (ref 0.6–4.6)
LYMPHOCYTES NFR BLD AUTO: 19.7 %
MCH RBC QN AUTO: 33.4 PG (ref 27–31)
MCHC RBC AUTO-ENTMCNC: 32.2 G/DL (ref 33–36)
MCV RBC AUTO: 103.9 FL (ref 80–94)
MONOCYTES # BLD AUTO: 0.71 X10(3)/MCL (ref 0.1–1.3)
MONOCYTES NFR BLD AUTO: 8.7 %
MUCOUS THREADS URNS QL MICRO: ABNORMAL /LPF
NEUTROPHILS # BLD AUTO: 5.65 X10(3)/MCL (ref 2.1–9.2)
NEUTROPHILS NFR BLD AUTO: 69.6 %
NITRITE UR QL STRIP.AUTO: NEGATIVE
NRBC BLD AUTO-RTO: 0 %
PH UR STRIP.AUTO: 6.5 [PH]
PLATELET # BLD AUTO: 164 X10(3)/MCL (ref 130–400)
PMV BLD AUTO: 8.7 FL (ref 7.4–10.4)
POTASSIUM SERPL-SCNC: 4 MMOL/L (ref 3.5–5.1)
PROT SERPL-MCNC: 6 GM/DL (ref 5.8–7.6)
PROT UR QL STRIP.AUTO: ABNORMAL
RBC # BLD AUTO: 3.89 X10(6)/MCL (ref 4.2–5.4)
RBC #/AREA URNS AUTO: ABNORMAL /HPF
RBC UR QL AUTO: ABNORMAL
SODIUM SERPL-SCNC: 144 MMOL/L (ref 132–146)
SP GR UR STRIP.AUTO: 1.02 (ref 1–1.03)
SQUAMOUS #/AREA URNS LPF: ABNORMAL /HPF
TRIGL SERPL-MCNC: 70 MG/DL (ref 37–140)
TSH SERPL-ACNC: 1.96 UIU/ML (ref 0.35–4.94)
UROBILINOGEN UR STRIP-ACNC: NORMAL
VLDLC SERPL CALC-MCNC: 14 MG/DL
WBC # SPEC AUTO: 8.12 X10(3)/MCL (ref 4.5–11.5)
WBC #/AREA URNS AUTO: ABNORMAL /HPF

## 2023-11-27 PROCEDURE — 81001 URINALYSIS AUTO W/SCOPE: CPT

## 2023-11-27 PROCEDURE — 84443 ASSAY THYROID STIM HORMONE: CPT

## 2023-11-27 PROCEDURE — 80053 COMPREHEN METABOLIC PANEL: CPT

## 2023-11-27 PROCEDURE — 85025 COMPLETE CBC W/AUTO DIFF WBC: CPT

## 2023-11-27 PROCEDURE — 83036 HEMOGLOBIN GLYCOSYLATED A1C: CPT

## 2023-11-27 PROCEDURE — 80061 LIPID PANEL: CPT

## 2023-11-27 PROCEDURE — 36415 COLL VENOUS BLD VENIPUNCTURE: CPT

## 2023-11-30 ENCOUNTER — OFFICE VISIT (OUTPATIENT)
Dept: PRIMARY CARE CLINIC | Facility: CLINIC | Age: 88
End: 2023-11-30
Payer: MEDICARE

## 2023-11-30 ENCOUNTER — LAB VISIT (OUTPATIENT)
Dept: LAB | Facility: HOSPITAL | Age: 88
End: 2023-11-30
Attending: STUDENT IN AN ORGANIZED HEALTH CARE EDUCATION/TRAINING PROGRAM
Payer: MEDICARE

## 2023-11-30 VITALS
DIASTOLIC BLOOD PRESSURE: 73 MMHG | HEART RATE: 68 BPM | OXYGEN SATURATION: 98 % | RESPIRATION RATE: 20 BRPM | BODY MASS INDEX: 36 KG/M2 | TEMPERATURE: 98 F | HEIGHT: 62 IN | WEIGHT: 195.63 LBS | SYSTOLIC BLOOD PRESSURE: 116 MMHG

## 2023-11-30 DIAGNOSIS — E03.9 HYPOTHYROIDISM, UNSPECIFIED TYPE: Chronic | ICD-10-CM

## 2023-11-30 DIAGNOSIS — Z00.00 WELLNESS EXAMINATION: Primary | ICD-10-CM

## 2023-11-30 DIAGNOSIS — R71.8 RBC ABNORMALITY: ICD-10-CM

## 2023-11-30 DIAGNOSIS — D75.89 MACROCYTOSIS WITHOUT ANEMIA: ICD-10-CM

## 2023-11-30 DIAGNOSIS — R31.9 HEMATURIA, UNSPECIFIED TYPE: ICD-10-CM

## 2023-11-30 DIAGNOSIS — E66.01 SEVERE OBESITY (BMI 35.0-39.9) WITH COMORBIDITY: ICD-10-CM

## 2023-11-30 DIAGNOSIS — I77.9 DISORDER OF CAROTID ARTERY: ICD-10-CM

## 2023-11-30 LAB
APPEARANCE UR: CLEAR
BACTERIA #/AREA URNS AUTO: NORMAL /HPF
BILIRUB UR QL STRIP.AUTO: NEGATIVE
COLOR UR AUTO: COLORLESS
FERRITIN SERPL-MCNC: 159.41 NG/ML (ref 4.63–204)
FOLATE SERPL-MCNC: 19.2 NG/ML (ref 7–31.4)
GLUCOSE UR QL STRIP.AUTO: NORMAL
IRON SATN MFR SERPL: 20 % (ref 20–50)
IRON SERPL-MCNC: 51 UG/DL (ref 50–170)
KETONES UR QL STRIP.AUTO: NEGATIVE
LEUKOCYTE ESTERASE UR QL STRIP.AUTO: NEGATIVE
NITRITE UR QL STRIP.AUTO: NEGATIVE
PH UR STRIP.AUTO: 7 [PH]
PROT UR QL STRIP.AUTO: NEGATIVE
RBC #/AREA URNS AUTO: NORMAL /HPF
RBC UR QL AUTO: NEGATIVE
SP GR UR STRIP.AUTO: 1.01 (ref 1–1.03)
SQUAMOUS #/AREA URNS LPF: NORMAL /HPF
TIBC SERPL-MCNC: 207 UG/DL (ref 70–310)
TIBC SERPL-MCNC: 258 UG/DL (ref 250–450)
TRANSFERRIN SERPL-MCNC: 252 MG/DL
UROBILINOGEN UR STRIP-ACNC: NORMAL
VIT B12 SERPL-MCNC: 1127 PG/ML (ref 213–816)
WBC #/AREA URNS AUTO: NORMAL /HPF

## 2023-11-30 PROCEDURE — 83540 ASSAY OF IRON: CPT

## 2023-11-30 PROCEDURE — 81001 URINALYSIS AUTO W/SCOPE: CPT

## 2023-11-30 PROCEDURE — 82728 ASSAY OF FERRITIN: CPT

## 2023-11-30 PROCEDURE — G0439 PR MEDICARE ANNUAL WELLNESS SUBSEQUENT VISIT: ICD-10-PCS | Mod: ,,, | Performed by: STUDENT IN AN ORGANIZED HEALTH CARE EDUCATION/TRAINING PROGRAM

## 2023-11-30 PROCEDURE — 82607 VITAMIN B-12: CPT

## 2023-11-30 PROCEDURE — 82746 ASSAY OF FOLIC ACID SERUM: CPT

## 2023-11-30 PROCEDURE — G0439 PPPS, SUBSEQ VISIT: HCPCS | Mod: ,,, | Performed by: STUDENT IN AN ORGANIZED HEALTH CARE EDUCATION/TRAINING PROGRAM

## 2023-11-30 PROCEDURE — 36415 COLL VENOUS BLD VENIPUNCTURE: CPT

## 2023-11-30 RX ORDER — BUMETANIDE 2 MG/1
TABLET ORAL
COMMUNITY
Start: 2023-08-07

## 2023-11-30 RX ORDER — METOLAZONE 2.5 MG/1
TABLET ORAL
COMMUNITY
Start: 2023-08-24

## 2023-11-30 RX ORDER — TOBRAMYCIN AND DEXAMETHASONE 3; 1 MG/ML; MG/ML
SUSPENSION/ DROPS OPHTHALMIC
COMMUNITY
Start: 2023-06-16

## 2023-11-30 NOTE — PROGRESS NOTES
Annual Medicare Wellness Visit     Patient ID: [id number]     Chief Complaint: Medicare Annual     HPI:     Shamika Pleitez is a 89 y.o. female here today for a Medicare Wellness. Daughter is also present for the visit. Lives in assistant living at [place]. Overall doing well. Been fighting with lower extremity swelling, cardiology switched diuretic therapy on Bumex and metolazone, will lasix PRN. Denies any SOB, dyspnea on exertion, inability to lay flat. Reviewed labs, answered all questions and concerns at this time. Showed low RBC count, high MCV, stable BUN/Creatinine, high Alkaline phosphatase, low albumin. Urinalysis showed blood, occasional bacteria, skin octavio cells. Admits that it wasn't the cleanest sample, dropped paper in the collection. Denies any urinary tract infection symptoms.     Opioid Screening: Patient medication list reviewed, patient is not taking prescription opioids. Patient is not using additional opioids than prescribed. Patient is at low risk of substance abuse based on this opioid use history.     Past Medical History:   CAD (coronary artery disease)  Diastolic dysfunction  Hearing loss  History of CVA (cerebrovascular accident)  Hypothyroidism  Paroxysmal atrial fibrillation  Primary hypertension     Past Surgical History:   Procedure Laterality Date    EXCISIONAL HEMORRHOIDECTOMY      EYE SURGERY  1990s    cataract    HYSTERECTOMY      KNEE SURGERY Right     right knee replacement    left cea      left shoulder repair      right cea       Review of patient's allergies indicates:   Allergen Reactions    Sulfacetamide      Outpatient Medications Marked as Taking for the 11/30/23 encounter (Office Visit) with Isabella Miller MD   Medication Sig Dispense Refill    acetaminophen (TYLENOL) 500 MG tablet Take 1 tablet (500 mg total) by mouth every 6 (six) hours as needed for Pain. 100 tablet 3    atorvastatin (LIPITOR) 20 MG tablet Take 1 tablet (20 mg total) by mouth once daily. 90  tablet 3    bumetanide (BUMEX) 2 MG tablet       docusate sodium (COLACE) 100 MG capsule Take 1 capsule (100 mg total) by mouth Daily. 30 capsule 11    ELIQUIS 5 mg Tab Take 1 tablet (5 mg total) by mouth 2 (two) times daily. 180 tablet 3    EScitalopram oxalate (LEXAPRO) 10 MG tablet Take 1 tablet (10 mg total) by mouth once daily. 30 tablet 11    levothyroxine (SYNTHROID) 75 MCG tablet Take 1 tablet (75 mcg total) by mouth once daily. 90 tablet 3    metOLazone (ZAROXOLYN) 2.5 MG tablet       multivitamin-iron-folic acid (CENTRUM COMPLETE) Tab Take 1 tablet by mouth Daily. 30 tablet 11    psyllium 0.52 gram capsule Take 1 capsule (0.52 g total) by mouth once daily. 30 capsule 11    tobramycin-dexAMETHasone 0.3-0.1% (TOBRADEX) 0.3-0.1 % DrpS Place into both eyes.       Social History     Socioeconomic History    Marital status:    Tobacco Use    Smoking status: Never    Smokeless tobacco: Never   Substance and Sexual Activity    Alcohol use: Never    Drug use: Never    Sexual activity: Not Currently     Partners: Male     Birth control/protection: Abstinence     Social Determinants of Health     Financial Resource Strain: Low Risk  (11/30/2023)    Overall Financial Resource Strain (CARDIA)     Difficulty of Paying Living Expenses: Not hard at all   Food Insecurity: No Food Insecurity (11/30/2023)    Hunger Vital Sign     Worried About Running Out of Food in the Last Year: Never true     Ran Out of Food in the Last Year: Never true   Transportation Needs: No Transportation Needs (11/30/2023)    PRAPARE - Transportation     Lack of Transportation (Medical): No     Lack of Transportation (Non-Medical): No   Physical Activity: Inactive (11/30/2023)    Exercise Vital Sign     Days of Exercise per Week: 0 days     Minutes of Exercise per Session: 0 min   Stress: No Stress Concern Present (11/30/2023)    [employer] - Occupational Stress Questionnaire     Feeling of Stress : Only a  little   Social Connections: Unknown (11/30/2023)    Social Connection and Isolation Panel [NHANES]     Frequency of Communication with Friends and Family: Twice a week     Frequency of Social Gatherings with Friends and Family: More than three times a week     Active Member of Clubs or Organizations: No     Attends Club or Organization Meetings: Never     Marital Status:    Housing Stability: Low Risk  (11/30/2023)    Housing Stability Vital Sign     Unable to Pay for Housing in the Last Year: No     Number of Places Lived in the Last Year: 1     Unstable Housing in the Last Year: No     Family History   Problem Relation Age of Onset    Alcohol abuse Father     Multiple sclerosis Sister       Patient Care Team:  Isabella Miller MD as PCP - General (Internal Medicine)  Torey Lowry MD as Consulting Physician (Cardiovascular Disease)     Subjective:     Review of Systems   Constitutional:  Negative for chills and fever.   HENT:  Negative for hearing loss.    Eyes:  Negative for discharge.   Respiratory:  Negative for wheezing.    Cardiovascular:  Positive for leg swelling. Negative for chest pain and palpitations.   Gastrointestinal:  Negative for blood in stool, constipation, diarrhea and vomiting.   Genitourinary:  Negative for dysuria, frequency, hematuria and urgency.   Musculoskeletal:  Negative for neck pain.   Neurological:  Negative for weakness and headaches.   Endo/Heme/Allergies:  Negative for polydipsia.     Patient Reported Health Risk Assessment  What is your age?: 80 or older  Are you male or female?: Female  During the past four weeks, how much have you been bothered by emotional problems such as feeling anxious, depressed, irritable, sad, or downhearted and blue?: Not at all  During the past five weeks, has your physical and/or emotional health limited your social activities with family, friends, neighbors, or groups?: Not at all  During the past four weeks, how much bodily pain  have you generally had?: Very mild pain  During the past four weeks, was someone available to help if you needed and wanted help?: Yes, as much as I wanted  During the past four weeks, what was the hardest physical activity you could do for at least two minutes?: Moderate  Can you get to places out of walking distance without help?  (For example, can you travel alone on buses or taxis, or drive your own car?): Yes  Can you go shopping for groceries or clothes without someone's help?: Yes  Can you prepare your own meals?: Yes  Can you do your own housework without help?: Yes  Because of any health problems, do you need the help of another person with your personal care needs such as eating, bathing, dressing, or getting around the house?: No  Can you handle your own money without help?: Yes  During the past four weeks, how would you rate your health in general?: Good  How have things been going for you during the past four weeks?: Pretty well  Are you having difficulties driving your car?: No  Do you always fasten your seat belt when you are in a car?: Yes, usually  How often in the past four weeks have you been bothered by falling or dizzy when standing up?: Never  How often in the past four weeks have you been bothered by sexual problems?: Never  How often in the past four weeks have you been bothered by trouble eating well?: Seldom  How often in the past four weeks have you been bothered by teeth or denture problems?: Never  How often in the past four weeks have you been bothered with problems using the telephone?: Never  How often in the past four weeks have you been bothered by tiredness or fatigue?: Seldom  Have you fallen two or more times in the past year?: No  Are you afraid of falling?: No  Are you a smoker?: No  During the past four weeks, how many drinks of wine, beer, or other alcoholic beverages did you have?: No alcohol at all  Do you exercise for about 20 minutes three or more days a week?: Yes, most  "of the time  Have you been given any information to help you with hazards in your house that might hurt you?: Yes  Have you been given any information to help you with keeping track of your medications?: Yes  How often do you have trouble taking medicines the way you've been told to take them?: I always take them as prescribed  How confident are you that you can control and manage most of your health problems?: Very confident  What is your race? (Check all that apply.):     Objective:     /73 (BP Location: Right arm, Patient Position: Sitting, BP Method: Large (Automatic))   Pulse 68   Temp 98.1 °F (36.7 °C)   Resp 20   Ht 5' 2" (1.575 m)   Wt 88.7 kg (195 lb 9.6 oz)   SpO2 98%   BMI 35.78 kg/m²     Physical Exam  Vitals and nursing note reviewed.   Constitutional:       General: She is not in acute distress.     Appearance: Normal appearance. She is not ill-appearing.   HENT:      Head: Normocephalic.      Nose: Nose normal. No rhinorrhea.      Mouth/Throat:      Mouth: Mucous membranes are moist.   Eyes:      General: No scleral icterus.     Conjunctiva/sclera: Conjunctivae normal.   Cardiovascular:      Rate and Rhythm: Normal rate and regular rhythm.   Pulmonary:      Effort: Pulmonary effort is normal. No respiratory distress.   Musculoskeletal:      Right lower leg: Edema present.      Left lower leg: Edema present.   Skin:     General: Skin is warm and dry.      Coloration: Skin is not jaundiced.   Neurological:      Mental Status: She is alert and oriented to person, place, and time. Mental status is at baseline.      Cranial Nerves: No cranial nerve deficit.      Gait: Gait abnormal.      Comments: Ambulation with wheelchair/walker    Psychiatric:         Mood and Affect: Mood normal.         Behavior: Behavior normal.           4/10/2023    11:20 AM 1/5/2023    11:20 AM 11/8/2022     1:00 PM 6/21/2022    11:00 AM 6/18/2022    10:45 AM   Fall Risk Assessment - Outpatient   Mobility " Status Ambulatory w/ assistance Ambulatory Ambulatory Ambulatory w/ assistance Ambulatory w/ assistance   Number of falls 0 0 0 0 0   Identified as fall risk True False False False False     Depression Screening  Over the past two weeks, has the patient felt down, depressed, or hopeless?: No  Over the past two weeks, has the patient felt little interest or pleasure in doing things?: No  Functional Ability/Safety Screening  Was the patient's timed Up & Go test unsteady or longer than 30 seconds?: No  Does the patient need help with phone, transportation, shopping, preparing meals, housework, laundry, meds, or managing money?: No  Does the patient's home have rugs in the hallway, lack grab bars in the bathroom, lack handrails on the stairs or have poor lighting?: No  Have you noticed any hearing difficulties?: No  Cognitive Function (Assessed through direct observation with due consideration of information obtained by way of patient reports and/or concerns raised by family, friends, caretakers, or others)    Does the patient repeat questions/statements in the same day?: No  Does the patient have trouble remembering the date, year, and time?: No  Does the patient have difficulty managing finances?: No  Does the patient have a decreased sense of direction?: No    Assessment/Plan:     1. Wellness examination  Assessment & Plan:  Routine Health Maintenance   Exercise as tolerated, >30 minutes a day for 3-5 days a week.  Counseled patient on compliance with medications and healthy diet.     Age-Appropriate Screening  Vaccinations:    - Flu: UTD for this season   - Pneumonia: Completed    - Shingles (>50): Recommended 2 dose series, had the old 1 dose series; can obtain at local pharmacy    - Tdap: UTD, 2023   - COVID: 2 dose series completed, multiple boosters   - RSV: Discussed at visit, obtain at local pharmacy     Screening:   - Pap Smear (21-65): Aged out   - Mammogram (40-50 discuss w/ pt, all >50): Aged out   -  Osteoporosis (all>65, sooner if risk factors): Aged out   - Lung Ca. Screening (50-80 if >20 pack yrs current or quit <15 yrs): N/A   - Colon Ca. Screening (age >45): Aged out        2. Macrocytosis without anemia  -     Folate; Future; Expected date: 11/30/2023  -     Vitamin B12; Future; Expected date: 11/30/2023    3. RBC abnormality  -     Iron and TIBC; Future; Expected date: 11/30/2023  -     Ferritin; Future; Expected date: 11/30/2023    4. Hematuria, unspecified type  -     Urinalysis; Future; Expected date: 11/30/2023    5. Severe obesity (BMI 35.0-39.9) with comorbidity  Assessment & Plan:  Encouraged healthy lifestyle/diet modifications   Exercise 3-5x a week (>30 minutes, including a variety of cardio, weightbearing and lifting exercises)   Eat a well balanced diet comprised of fruit/vegetables, lean protein, and complex carbs  Multifactorial with fluid buildup, defer to cardiology. Weight daily, if increase by 3lbs in 1 day or 5lbs in 1 week take extra lasix         6. Disorder of carotid artery  Assessment & Plan:  Stable   Defer to Cardiology   Continue statin therapy and Eliquis       7. Hypothyroidism, unspecified type  Assessment & Plan:  Stable, continue Levothyroxine  Last TSH was WNL  Encouraged strict medication compliance (take in the AM on an empty stomach with a full glass of water, no food/drink or other medications for >30 minutes)          Medicare Annual Wellness and Personalized Prevention Plan:   Fall Risk + Home Safety + Hearing Impairment + Depression Screen + Opioid and Substance Abuse Screening + Cognitive Impairment Screen + Health Risk Assessment all reviewed.     Advance Care Planning   Discussed Advance Care Planning with patient and/or family member.  Education was provided including the importance of the Health Care Power of , Advance Directives, and/or [place] documentation.  The patient expressed understanding to the importance of this information and discussion.      Follow up in about 3 months (around 2/29/2024) for Medical Management. In addition to their scheduled follow up, the patient has also been instructed to follow up on as needed basis.

## 2023-12-01 PROBLEM — E66.01 SEVERE OBESITY (BMI 35.0-39.9) WITH COMORBIDITY: Status: ACTIVE | Noted: 2023-12-01

## 2023-12-01 PROBLEM — Z00.00 WELLNESS EXAMINATION: Chronic | Status: ACTIVE | Noted: 2023-11-30

## 2023-12-01 PROBLEM — I77.9 DISORDER OF CAROTID ARTERY: Chronic | Status: ACTIVE | Noted: 2022-06-23

## 2023-12-01 PROBLEM — E66.01 SEVERE OBESITY (BMI 35.0-39.9) WITH COMORBIDITY: Chronic | Status: ACTIVE | Noted: 2023-12-01

## 2023-12-01 NOTE — ASSESSMENT & PLAN NOTE
Stable, continue Levothyroxine  Last TSH was WNL  Encouraged strict medication compliance (take in the AM on an empty stomach with a full glass of water, no food/drink or other medications for >30 minutes)

## 2023-12-01 NOTE — ASSESSMENT & PLAN NOTE
Encouraged healthy lifestyle/diet modifications   Exercise 3-5x a week (>30 minutes, including a variety of cardio, weightbearing and lifting exercises)   Eat a well balanced diet comprised of fruit/vegetables, lean protein, and complex carbs  Multifactorial with fluid buildup, defer to cardiology. Weight daily, if increase by 3lbs in 1 day or 5lbs in 1 week take extra lasix

## 2023-12-01 NOTE — ASSESSMENT & PLAN NOTE
Routine Health Maintenance   Exercise as tolerated, >30 minutes a day for 3-5 days a week.  Counseled patient on compliance with medications and healthy diet.     Age-Appropriate Screening  Vaccinations:    - Flu: UTD for this season   - Pneumonia: Completed    - Shingles (>50): Recommended 2 dose series, had the old 1 dose series; can obtain at local pharmacy    - Tdap: UTD, 2023   - COVID: 2 dose series completed, multiple boosters   - RSV: Discussed at visit, obtain at local pharmacy     Screening:   - Pap Smear (21-65): Aged out   - Mammogram (40-50 discuss w/ pt, all >50): Aged out   - Osteoporosis (all>65, sooner if risk factors): Aged out   - Lung Ca. Screening (50-80 if >20 pack yrs current or quit <15 yrs): N/A   - Colon Ca. Screening (age >45): Aged out

## 2023-12-05 ENCOUNTER — EXTERNAL HOME HEALTH (OUTPATIENT)
Dept: HOME HEALTH SERVICES | Facility: HOSPITAL | Age: 88
End: 2023-12-05
Payer: MEDICARE

## 2023-12-26 DIAGNOSIS — K59.00 CONSTIPATION, UNSPECIFIED CONSTIPATION TYPE: Primary | ICD-10-CM

## 2023-12-26 DIAGNOSIS — E03.9 HYPOTHYROIDISM, UNSPECIFIED TYPE: ICD-10-CM

## 2023-12-26 RX ORDER — LEVOTHYROXINE SODIUM 75 UG/1
75 TABLET ORAL DAILY
Qty: 90 TABLET | Refills: 3 | Status: SHIPPED | OUTPATIENT
Start: 2023-12-26 | End: 2023-12-26 | Stop reason: SDUPTHER

## 2023-12-26 RX ORDER — DOCUSATE SODIUM 100 MG/1
100 CAPSULE, LIQUID FILLED ORAL DAILY
Qty: 30 CAPSULE | Refills: 11 | Status: SHIPPED | OUTPATIENT
Start: 2023-12-26

## 2023-12-26 RX ORDER — DOCUSATE SODIUM 100 MG/1
100 CAPSULE, LIQUID FILLED ORAL DAILY
Qty: 30 CAPSULE | Refills: 11 | Status: SHIPPED | OUTPATIENT
Start: 2023-12-26 | End: 2023-12-26 | Stop reason: SDUPTHER

## 2023-12-26 RX ORDER — LEVOTHYROXINE SODIUM 75 UG/1
75 TABLET ORAL DAILY
Qty: 90 TABLET | Refills: 3 | Status: SHIPPED | OUTPATIENT
Start: 2023-12-26 | End: 2024-12-25

## 2024-01-11 ENCOUNTER — TELEPHONE (OUTPATIENT)
Dept: PRIMARY CARE CLINIC | Facility: CLINIC | Age: 89
End: 2024-01-11
Payer: MEDICARE

## 2024-01-11 NOTE — TELEPHONE ENCOUNTER
Received a call from Cindy with [facility]. She is asking for a verbal to continue treatment 1x per week for the next 8 weeks. She is also asking if she can add a dx for urinary incontinence to the plan to start treatment for that as well.

## 2024-01-13 PROCEDURE — G0179 MD RECERTIFICATION HHA PT: HCPCS | Mod: ,,, | Performed by: STUDENT IN AN ORGANIZED HEALTH CARE EDUCATION/TRAINING PROGRAM

## 2024-01-30 ENCOUNTER — DOCUMENT SCAN (OUTPATIENT)
Dept: HOME HEALTH SERVICES | Facility: HOSPITAL | Age: 89
End: 2024-01-30
Payer: MEDICARE

## 2024-02-08 ENCOUNTER — EXTERNAL HOME HEALTH (OUTPATIENT)
Dept: HOME HEALTH SERVICES | Facility: HOSPITAL | Age: 89
End: 2024-02-08
Payer: MEDICARE

## 2024-03-04 PROBLEM — Z00.00 WELLNESS EXAMINATION: Chronic | Status: RESOLVED | Noted: 2023-11-30 | Resolved: 2024-03-04

## 2024-03-14 ENCOUNTER — TELEPHONE (OUTPATIENT)
Dept: PRIMARY CARE CLINIC | Facility: CLINIC | Age: 89
End: 2024-03-14
Payer: MEDICARE

## 2024-03-18 ENCOUNTER — PATIENT MESSAGE (OUTPATIENT)
Dept: PRIMARY CARE CLINIC | Facility: CLINIC | Age: 89
End: 2024-03-18
Payer: MEDICARE

## 2024-03-18 ENCOUNTER — TELEPHONE (OUTPATIENT)
Dept: PRIMARY CARE CLINIC | Facility: CLINIC | Age: 89
End: 2024-03-18
Payer: MEDICARE

## 2024-03-18 DIAGNOSIS — R54 FRAIL ELDERLY: Primary | ICD-10-CM

## 2024-03-18 DIAGNOSIS — M25.562 PAIN IN LATERAL PORTION OF LEFT KNEE: Primary | ICD-10-CM

## 2024-03-18 NOTE — TELEPHONE ENCOUNTER
----- Message from Dmitriy Barrios MA sent at 3/18/2024 11:48 AM CDT -----  Faxed orders. [facility] [phone]  ----- Message -----  From: Leslye Kim  Sent: 3/18/2024  11:39 AM CDT  To: Angela Mason Staff    .Type:  Patient Returning Call    Who Called:Destiney from [facility]  Who Left Message for Patient:Destiney  Does the patient know what this is regarding?:Call Back  Would the patient rather a call back or a response via [facility]?   Best Call Back Number:[phone]  Additional Information: Please call back. Patient fall on the 13th and has bruising on left knee and complaining of pain. Need and x-ray of left knee. The facility uses [facility] a portal facility please send  [phone]

## 2024-04-09 ENCOUNTER — OFFICE VISIT (OUTPATIENT)
Dept: PRIMARY CARE CLINIC | Facility: CLINIC | Age: 89
End: 2024-04-09
Payer: MEDICARE

## 2024-04-09 VITALS
WEIGHT: 195 LBS | BODY MASS INDEX: 35.88 KG/M2 | OXYGEN SATURATION: 98 % | TEMPERATURE: 99 F | SYSTOLIC BLOOD PRESSURE: 102 MMHG | HEIGHT: 62 IN | HEART RATE: 71 BPM | DIASTOLIC BLOOD PRESSURE: 63 MMHG | RESPIRATION RATE: 16 BRPM

## 2024-04-09 DIAGNOSIS — I77.9 DISORDER OF CAROTID ARTERY: ICD-10-CM

## 2024-04-09 DIAGNOSIS — I48.0 PAROXYSMAL ATRIAL FIBRILLATION: ICD-10-CM

## 2024-04-09 DIAGNOSIS — E66.01 SEVERE OBESITY (BMI 35.0-39.9) WITH COMORBIDITY: ICD-10-CM

## 2024-04-09 DIAGNOSIS — N18.31 CHRONIC KIDNEY DISEASE, STAGE 3A: Primary | ICD-10-CM

## 2024-04-09 PROCEDURE — 99213 OFFICE O/P EST LOW 20 MIN: CPT | Mod: ,,, | Performed by: STUDENT IN AN ORGANIZED HEALTH CARE EDUCATION/TRAINING PROGRAM

## 2024-05-08 PROBLEM — N18.31 CHRONIC KIDNEY DISEASE, STAGE 3A: Chronic | Status: ACTIVE | Noted: 2024-05-08

## 2024-05-08 PROBLEM — I50.33 ACUTE ON CHRONIC DIASTOLIC HEART FAILURE: Status: RESOLVED | Noted: 2023-04-21 | Resolved: 2024-05-08

## 2024-05-08 PROBLEM — N18.31 CHRONIC KIDNEY DISEASE, STAGE 3A: Status: ACTIVE | Noted: 2024-05-08

## 2024-05-08 PROBLEM — Z86.79 H/O ATRIAL FLUTTER: Status: ACTIVE | Noted: 2022-11-08

## 2024-05-09 NOTE — PROGRESS NOTES
Subjective:       Patient ID: Shamika Pleitez is a 89 y.o. female.    -------------------------------------    Acute on chronic diastolic heart failure    CAD (coronary artery disease)    Diastolic dysfunction    Hearing loss    History of CVA (cerebrovascular accident)    Hypothyroidism    Paroxysmal atrial fibrillation    Primary hypertension      Chief Complaint: Follow-up    Presents to the clinic for follow up. Daughter's are present as well. Overall doing well. Bruise is healing from fall. No medication refills at this time.     Follow-up  Pertinent negatives include no abdominal pain, chest pain, chills, coughing, fever, nausea or vomiting.     Review of Systems   Constitutional:  Negative for chills and fever.   Respiratory:  Negative for cough and shortness of breath.    Cardiovascular:  Negative for chest pain.   Gastrointestinal:  Negative for abdominal pain, diarrhea, nausea and vomiting.   Genitourinary:  Negative for dysuria and hematuria.         Objective:      Physical Exam  Vitals and nursing note reviewed.   Constitutional:       General: She is not in acute distress.     Appearance: Normal appearance. She is not ill-appearing.   HENT:      Head: Normocephalic.      Nose: Nose normal. No rhinorrhea.      Mouth/Throat:      Mouth: Mucous membranes are moist.   Eyes:      General: No scleral icterus.     Conjunctiva/sclera: Conjunctivae normal.   Cardiovascular:      Rate and Rhythm: Normal rate and regular rhythm.   Pulmonary:      Effort: Pulmonary effort is normal. No respiratory distress.   Skin:     General: Skin is warm and dry.      Coloration: Skin is not jaundiced.   Neurological:      Mental Status: She is alert and oriented to person, place, and time. Mental status is at baseline.      Cranial Nerves: No cranial nerve deficit.      Gait: Gait abnormal.      Comments: Ambulation with wheelchair/walker    Psychiatric:         Mood and Affect: Mood normal.         Behavior: Behavior normal.          Assessment & Plan:   1. Chronic kidney disease, stage 3a  Assessment & Plan:  Stable   Repeat BMP to monitor function   Follow low sodium diet (2 grams a day), control high blood pressure (goal <130/80), control diabetes (goal A1c <7.0%)   Decrease or stop alcohol michael   Do not take NSAIDs (Ibuprofen, Naproxen, Aleve, Toradol, Mobic), may only take Tylenol as needed for pain/headaches   Take cholesterol- lowering medications as prescribed (goal LDL <100)       Orders:  -     Basic Metabolic Panel; Future; Expected date: 07/05/2024    2. Severe obesity (BMI 35.0-39.9) with comorbidity  Assessment & Plan:  Encouraged healthy lifestyle/diet modifications   Exercise 3-5x a week (>30 minutes, including a variety of cardio, weightbearing and lifting exercises)   Eat a well balanced diet comprised of fruit/vegetables, lean protein, and complex carbs        3. Paroxysmal atrial fibrillation  Assessment & Plan:  Non-valvular AF  [id number] score: >3  Continue Eliquis  Defer Management to Cardiology, keep scheduled appointment         4. Disorder of carotid artery  Assessment & Plan:  Stable   Defer to Cardiology   Continue statin therapy and Eliquis         Follow up in about 3 months (around 7/9/2024) for Medical Management. In addition to their scheduled follow up, the patient has also been instructed to follow up on as needed basis.

## 2024-05-09 NOTE — ASSESSMENT & PLAN NOTE
Non-valvular AF  [id number] score: >3  Continue Eliquis  Defer Management to Cardiology, keep scheduled appointment

## 2024-05-09 NOTE — ASSESSMENT & PLAN NOTE
Stable   Repeat BMP to monitor function   Follow low sodium diet (2 grams a day), control high blood pressure (goal <130/80), control diabetes (goal A1c <7.0%)   Decrease or stop alcohol michael   Do not take NSAIDs (Ibuprofen, Naproxen, Aleve, Toradol, Mobic), may only take Tylenol as needed for pain/headaches   Take cholesterol- lowering medications as prescribed (goal LDL <100)

## 2024-05-21 DIAGNOSIS — E03.9 HYPOTHYROIDISM, UNSPECIFIED TYPE: ICD-10-CM

## 2024-05-21 RX ORDER — LEVOTHYROXINE SODIUM 75 UG/1
TABLET ORAL
Qty: 90 TABLET | Refills: 11 | Status: SHIPPED | OUTPATIENT
Start: 2024-05-21

## 2024-07-02 ENCOUNTER — LAB VISIT (OUTPATIENT)
Dept: LAB | Facility: HOSPITAL | Age: 89
End: 2024-07-02
Attending: STUDENT IN AN ORGANIZED HEALTH CARE EDUCATION/TRAINING PROGRAM
Payer: MEDICARE

## 2024-07-02 ENCOUNTER — PATIENT MESSAGE (OUTPATIENT)
Dept: PRIMARY CARE CLINIC | Facility: CLINIC | Age: 89
End: 2024-07-02
Payer: MEDICARE

## 2024-07-02 DIAGNOSIS — N18.31 CHRONIC KIDNEY DISEASE, STAGE 3A: ICD-10-CM

## 2024-07-02 LAB
ANION GAP SERPL CALC-SCNC: 14 MEQ/L
BUN SERPL-MCNC: 30.8 MG/DL (ref 9.8–20.1)
CALCIUM SERPL-MCNC: 8.9 MG/DL (ref 8.4–10.2)
CHLORIDE SERPL-SCNC: 96 MMOL/L (ref 98–111)
CO2 SERPL-SCNC: 29 MMOL/L (ref 23–31)
CREAT SERPL-MCNC: 1.22 MG/DL (ref 0.55–1.02)
CREAT/UREA NIT SERPL: 25
GFR SERPLBLD CREATININE-BSD FMLA CKD-EPI: 42 ML/MIN/1.73/M2
GLUCOSE SERPL-MCNC: 89 MG/DL (ref 75–121)
POTASSIUM SERPL-SCNC: 3.6 MMOL/L (ref 3.5–5.1)
SODIUM SERPL-SCNC: 139 MMOL/L (ref 136–145)

## 2024-07-02 PROCEDURE — 80048 BASIC METABOLIC PNL TOTAL CA: CPT

## 2024-07-02 PROCEDURE — 36415 COLL VENOUS BLD VENIPUNCTURE: CPT

## 2024-07-03 ENCOUNTER — TELEPHONE (OUTPATIENT)
Dept: PRIMARY CARE CLINIC | Facility: CLINIC | Age: 89
End: 2024-07-03
Payer: MEDICARE

## 2024-07-09 ENCOUNTER — OFFICE VISIT (OUTPATIENT)
Dept: PRIMARY CARE CLINIC | Facility: CLINIC | Age: 89
End: 2024-07-09
Payer: MEDICARE

## 2024-07-09 VITALS
WEIGHT: 200 LBS | HEIGHT: 62 IN | TEMPERATURE: 98 F | SYSTOLIC BLOOD PRESSURE: 96 MMHG | DIASTOLIC BLOOD PRESSURE: 68 MMHG | OXYGEN SATURATION: 96 % | HEART RATE: 77 BPM | RESPIRATION RATE: 18 BRPM | BODY MASS INDEX: 36.8 KG/M2

## 2024-07-09 DIAGNOSIS — E78.2 MIXED HYPERLIPIDEMIA: Chronic | ICD-10-CM

## 2024-07-09 DIAGNOSIS — I10 PRIMARY HYPERTENSION: Chronic | ICD-10-CM

## 2024-07-09 DIAGNOSIS — N18.32 CKD STAGE 3B, GFR 30-44 ML/MIN: Chronic | ICD-10-CM

## 2024-07-09 DIAGNOSIS — E03.9 HYPOTHYROIDISM, UNSPECIFIED TYPE: Chronic | ICD-10-CM

## 2024-07-09 DIAGNOSIS — Z00.00 WELLNESS EXAMINATION: ICD-10-CM

## 2024-07-09 DIAGNOSIS — L57.0 ACTINIC KERATOSES: Primary | ICD-10-CM

## 2024-07-09 DIAGNOSIS — N25.81 SECONDARY HYPERPARATHYROIDISM: ICD-10-CM

## 2024-07-09 NOTE — PROGRESS NOTES
Subjective:       Patient ID: Shamika Pleitez is a 89 y.o. female.    -------------------------------------    Acute on chronic diastolic heart failure    CAD (coronary artery disease)    Diastolic dysfunction    Hearing loss    History of CVA (cerebrovascular accident)    Hypothyroidism    Paroxysmal atrial fibrillation    Primary hypertension        Chief Complaint: Follow-up    Presents to the clinic for follow-up.  Overall doing well.  Daughters are also present.  No medication refills.  Daughter is concerned about a skin lesion on the left shoulder.  Patient has not seen Dermatology for skin check in years.      Review of Systems   Constitutional:  Negative for chills and fever.   Respiratory:  Negative for cough and shortness of breath.    Cardiovascular:  Negative for chest pain and leg swelling.   Gastrointestinal:  Negative for abdominal pain and vomiting.   Genitourinary:  Negative for dysuria and hematuria.   Integumentary:  Positive for mole/lesion.         Objective:      Physical Exam  Vitals and nursing note reviewed.   Constitutional:       General: She is not in acute distress.     Appearance: Normal appearance. She is not ill-appearing.   HENT:      Head: Normocephalic.      Nose: Nose normal. No rhinorrhea.      Mouth/Throat:      Mouth: Mucous membranes are moist.   Eyes:      General: No scleral icterus.     Conjunctiva/sclera: Conjunctivae normal.   Cardiovascular:      Rate and Rhythm: Normal rate and regular rhythm.   Pulmonary:      Effort: Pulmonary effort is normal. No respiratory distress.   Musculoskeletal:      Right lower leg: No edema.      Left lower leg: No edema.   Skin:     General: Skin is warm and dry.      Coloration: Skin is not jaundiced.      Comments: AK on L hand  AK on L shoulder    Neurological:      Mental Status: She is alert and oriented to person, place, and time. Mental status is at baseline.      Cranial Nerves: No cranial nerve deficit.      Gait: Gait abnormal.    Psychiatric:         Mood and Affect: Mood normal.         Behavior: Behavior normal.         Assessment & Plan:   1. Actinic keratoses  -     Ambulatory referral/consult to Dermatology; Future; Expected date: 07/16/2024    2. CKD stage 3b, GFR 30-44 ml/min  Assessment & Plan:  Slightly increased due to dehydration, encouraged po hydration  Repeat BMP to monitor function   Follow low sodium diet (2 grams a day), control high blood pressure (goal <130/80), control diabetes (goal A1c <7.0%)   Decrease or stop alcohol michael   Do not take NSAIDs (Ibuprofen, Naproxen, Aleve, Toradol, Mobic), may only take Tylenol as needed for pain/headaches   Take cholesterol- lowering medications as prescribed (goal LDL <100)       Orders:  -     CBC Auto Differential; Future; Expected date: 12/01/2024  -     Comprehensive Metabolic Panel; Future; Expected date: 12/01/2024  -     Lipid Panel; Future; Expected date: 12/01/2024  -     Urinalysis; Future; Expected date: 12/01/2024  -     TSH; Future; Expected date: 12/01/2024  -     PTH, Intact; Future; Expected date: 12/01/2024    3. Hypothyroidism, unspecified type  Assessment & Plan:  Stable, continue Levothyroxine  Last TSH was WNL  Encouraged strict medication compliance (take in the AM on an empty stomach with a full glass of water, no food/drink or other medications for >30 minutes)   Repeat TSH for upcoming wellness visit     Orders:  -     CBC Auto Differential; Future; Expected date: 12/01/2024  -     Comprehensive Metabolic Panel; Future; Expected date: 12/01/2024  -     Lipid Panel; Future; Expected date: 12/01/2024  -     Urinalysis; Future; Expected date: 12/01/2024  -     TSH; Future; Expected date: 12/01/2024  -     PTH, Intact; Future; Expected date: 12/01/2024    4. Mixed hyperlipidemia  Assessment & Plan:  Last Lipid Panel WNL  ASCVD calculated 10 year risk >7.5%  Continue Lipitor 20mg daily  Repeat lipid panel for upcoming wellness visit   Counseled on dietary  modifications  Counseled to exercise 150 minutes weekly as exercise raises HDL and lowers LDL       Orders:  -     CBC Auto Differential; Future; Expected date: 12/01/2024  -     Comprehensive Metabolic Panel; Future; Expected date: 12/01/2024  -     Lipid Panel; Future; Expected date: 12/01/2024  -     Urinalysis; Future; Expected date: 12/01/2024  -     TSH; Future; Expected date: 12/01/2024  -     PTH, Intact; Future; Expected date: 12/01/2024    5. Primary hypertension  Assessment & Plan:  Today's BP WNL  Continue bumex  Metolazone as needed  Counseled on low sodium diet, exercise, tobacco avoidance, and limiting alcohol intake   Pt. Has home BP cuff, instructed to measure home BP and report abnormal values       Orders:  -     CBC Auto Differential; Future; Expected date: 12/01/2024  -     Comprehensive Metabolic Panel; Future; Expected date: 12/01/2024  -     Lipid Panel; Future; Expected date: 12/01/2024  -     Urinalysis; Future; Expected date: 12/01/2024  -     TSH; Future; Expected date: 12/01/2024  -     PTH, Intact; Future; Expected date: 12/01/2024    6. Secondary hyperparathyroidism  Assessment & Plan:  Normal calcium levels   Likely from CKD   Continue to monitor, repeat PTH      Orders:  -     CBC Auto Differential; Future; Expected date: 12/01/2024  -     Comprehensive Metabolic Panel; Future; Expected date: 12/01/2024  -     Lipid Panel; Future; Expected date: 12/01/2024  -     Urinalysis; Future; Expected date: 12/01/2024  -     TSH; Future; Expected date: 12/01/2024  -     PTH, Intact; Future; Expected date: 12/01/2024    7. Wellness examination  -     CBC Auto Differential; Future; Expected date: 12/01/2024  -     Comprehensive Metabolic Panel; Future; Expected date: 12/01/2024  -     Lipid Panel; Future; Expected date: 12/01/2024  -     Urinalysis; Future; Expected date: 12/01/2024  -     TSH; Future; Expected date: 12/01/2024  -     PTH, Intact; Future; Expected date: 12/01/2024      Follow up  in about 5 months (around 12/9/2024) for Wellness. In addition to their scheduled follow up, the patient has also been instructed to follow up on as needed basis.

## 2024-07-09 NOTE — ASSESSMENT & PLAN NOTE
Last Lipid Panel WNL  ASCVD calculated 10 year risk >7.5%  Continue Lipitor 20mg daily  Repeat lipid panel for upcoming wellness visit   Counseled on dietary modifications  Counseled to exercise 150 minutes weekly as exercise raises HDL and lowers LDL

## 2024-07-09 NOTE — ASSESSMENT & PLAN NOTE
Slightly increased due to dehydration, encouraged po hydration  Repeat BMP to monitor function   Follow low sodium diet (2 grams a day), control high blood pressure (goal <130/80), control diabetes (goal A1c <7.0%)   Decrease or stop alcohol michael   Do not take NSAIDs (Ibuprofen, Naproxen, Aleve, Toradol, Mobic), may only take Tylenol as needed for pain/headaches   Take cholesterol- lowering medications as prescribed (goal LDL <100)

## 2024-07-09 NOTE — ASSESSMENT & PLAN NOTE
Stable, continue Levothyroxine  Last TSH was WNL  Encouraged strict medication compliance (take in the AM on an empty stomach with a full glass of water, no food/drink or other medications for >30 minutes)   Repeat TSH for upcoming wellness visit

## 2024-07-09 NOTE — ASSESSMENT & PLAN NOTE
Today's BP WNL  Continue bumex  Metolazone as needed  Counseled on low sodium diet, exercise, tobacco avoidance, and limiting alcohol intake   Pt. Has home BP cuff, instructed to measure home BP and report abnormal values

## 2024-07-20 ENCOUNTER — PATIENT MESSAGE (OUTPATIENT)
Dept: PRIMARY CARE CLINIC | Facility: CLINIC | Age: 89
End: 2024-07-20
Payer: MEDICARE

## 2024-07-20 DIAGNOSIS — F33.0 MILD EPISODE OF RECURRENT MAJOR DEPRESSIVE DISORDER: Primary | ICD-10-CM

## 2024-07-22 RX ORDER — ESCITALOPRAM OXALATE 20 MG/1
20 TABLET ORAL DAILY
Qty: 90 TABLET | Refills: 3 | Status: SHIPPED | OUTPATIENT
Start: 2024-07-22 | End: 2025-07-22

## 2024-08-08 ENCOUNTER — PATIENT MESSAGE (OUTPATIENT)
Dept: PRIMARY CARE CLINIC | Facility: CLINIC | Age: 89
End: 2024-08-08
Payer: MEDICARE

## 2024-08-08 DIAGNOSIS — E03.9 HYPOTHYROIDISM, UNSPECIFIED TYPE: ICD-10-CM

## 2024-08-15 ENCOUNTER — PATIENT MESSAGE (OUTPATIENT)
Dept: PRIMARY CARE CLINIC | Facility: CLINIC | Age: 89
End: 2024-08-15
Payer: MEDICARE

## 2024-09-10 DIAGNOSIS — E78.2 MIXED HYPERLIPIDEMIA: ICD-10-CM

## 2024-09-10 RX ORDER — ATORVASTATIN CALCIUM 20 MG/1
20 TABLET, FILM COATED ORAL DAILY
Qty: 30 TABLET | Refills: 11 | Status: SHIPPED | OUTPATIENT
Start: 2024-09-10 | End: 2025-09-10

## 2024-09-30 ENCOUNTER — OFFICE VISIT (OUTPATIENT)
Dept: PRIMARY CARE CLINIC | Facility: CLINIC | Age: 89
End: 2024-09-30
Payer: MEDICARE

## 2024-09-30 VITALS
BODY MASS INDEX: 36.8 KG/M2 | WEIGHT: 200 LBS | RESPIRATION RATE: 20 BRPM | SYSTOLIC BLOOD PRESSURE: 126 MMHG | DIASTOLIC BLOOD PRESSURE: 72 MMHG | HEIGHT: 62 IN | TEMPERATURE: 98 F | HEART RATE: 66 BPM | OXYGEN SATURATION: 99 %

## 2024-09-30 DIAGNOSIS — S81.819A SKIN TEAR OF LOWER LEG WITHOUT COMPLICATION, INITIAL ENCOUNTER: ICD-10-CM

## 2024-09-30 DIAGNOSIS — L30.9 DERMATITIS: Primary | ICD-10-CM

## 2024-09-30 DIAGNOSIS — R68.89 EXERCISE INTOLERANCE: ICD-10-CM

## 2024-09-30 DIAGNOSIS — R26.81 GAIT INSTABILITY: ICD-10-CM

## 2024-09-30 PROCEDURE — 99213 OFFICE O/P EST LOW 20 MIN: CPT | Mod: ,,, | Performed by: STUDENT IN AN ORGANIZED HEALTH CARE EDUCATION/TRAINING PROGRAM

## 2024-09-30 RX ORDER — MUPIROCIN 20 MG/G
OINTMENT TOPICAL 2 TIMES DAILY PRN
Qty: 22 G | Refills: 6 | Status: SHIPPED | OUTPATIENT
Start: 2024-09-30

## 2024-09-30 RX ORDER — TRIAMCINOLONE ACETONIDE 1 MG/G
CREAM TOPICAL 2 TIMES DAILY PRN
Qty: 453.6 G | Refills: 0 | Status: SHIPPED | OUTPATIENT
Start: 2024-09-30

## 2024-09-30 RX ORDER — CETIRIZINE HYDROCHLORIDE 10 MG/1
10 TABLET ORAL DAILY
Qty: 90 TABLET | Refills: 3 | Status: SHIPPED | OUTPATIENT
Start: 2024-09-30 | End: 2025-09-30

## 2024-09-30 RX ORDER — HYDROXYZINE HYDROCHLORIDE 25 MG/1
25 TABLET, FILM COATED ORAL 3 TIMES DAILY PRN
Qty: 90 TABLET | Refills: 3 | Status: SHIPPED | OUTPATIENT
Start: 2024-09-30

## 2024-09-30 RX ORDER — HYDROXYZINE HYDROCHLORIDE 25 MG/1
25 TABLET, FILM COATED ORAL 3 TIMES DAILY PRN
Qty: 90 TABLET | Refills: 3 | Status: SHIPPED | OUTPATIENT
Start: 2024-09-30 | End: 2024-09-30

## 2024-10-02 NOTE — PROGRESS NOTES
Subjective:       Patient ID: Shamika Pleitez is a 89 y.o. female.    -------------------------------------    Acute on chronic diastolic heart failure    CAD (coronary artery disease)    Diastolic dysfunction    Hearing loss    History of CVA (cerebrovascular accident)    Hypothyroidism    Paroxysmal atrial fibrillation    Primary hypertension      Chief Complaint: Rash (Arms and legs /Ongoing about 2 weeks/Otc anti itch and calimine clear. )    Presents to the clinic for sick same-day visit.  Patient endorsed a rash that is itchy, red in nature started on the arms and now both legs. Itching and causing skin tears and bruising. Unclear if any new exposure to detergent, perfumes, fragrances in general. States that the bath washes have not changed. Also complained of worsening gait, exercise intolerance, and fear of falling.       Review of Systems   Constitutional:  Negative for chills and fever.        Generalized weakness, exercise intolerance with walking, unstable standing/walking more   Respiratory:  Negative for cough.    Cardiovascular:  Negative for chest pain.   Gastrointestinal:  Negative for abdominal pain and vomiting.   Genitourinary:  Negative for dysuria and hematuria.   Musculoskeletal:  Positive for gait problem.   Integumentary:  Positive for rash and wound.         Objective:      Physical Exam  Vitals and nursing note reviewed.   Constitutional:       General: She is not in acute distress.     Appearance: Normal appearance. She is not ill-appearing.   HENT:      Head: Normocephalic.      Nose: Nose normal. No rhinorrhea.      Mouth/Throat:      Mouth: Mucous membranes are moist.   Eyes:      General: No scleral icterus.     Conjunctiva/sclera: Conjunctivae normal.   Cardiovascular:      Rate and Rhythm: Normal rate and regular rhythm.   Pulmonary:      Effort: Pulmonary effort is normal. No respiratory distress.   Skin:     General: Skin is warm and dry.      Coloration: Skin is not jaundiced.       Findings: Rash present.      Comments: Mild skin breakdown on right shin area   Neurological:      Mental Status: She is alert and oriented to person, place, and time. Mental status is at baseline.      Cranial Nerves: No cranial nerve deficit.      Gait: Gait abnormal.      Comments: Ambulation with wheelchair/walker    Psychiatric:         Mood and Affect: Mood normal.         Behavior: Behavior normal.         Assessment & Plan:   1. Dermatitis  -     triamcinolone acetonide 0.1% (KENALOG) 0.1 % cream; Apply topically 2 (two) times daily as needed (rash).  Dispense: 453.6 g; Refill: 0  -     cetirizine (ZYRTEC) 10 MG tablet; Take 1 tablet (10 mg total) by mouth once daily.  Dispense: 90 tablet; Refill: 3  -     hydrOXYzine HCL (ATARAX) 25 MG tablet; Take 1 tablet (25 mg total) by mouth 3 (three) times daily as needed for Itching.  Dispense: 90 tablet; Refill: 3    2. Gait instability  -     Ambulatory referral/consult to Home Health; Future; Expected date: 10/01/2024    3. Exercise intolerance  -     Ambulatory referral/consult to Home Health; Future; Expected date: 10/01/2024    4. Skin tear of lower leg without complication, initial encounter  -     mupirocin (BACTROBAN) 2 % ointment; Apply topically 2 (two) times daily as needed (skin tear/cut).  Dispense: 22 g; Refill: 6    Follow up if symptoms worsen or fail to improve. In addition to their scheduled follow up, the patient has also been instructed to follow up on as needed basis.

## 2024-10-03 ENCOUNTER — TELEPHONE (OUTPATIENT)
Dept: PRIMARY CARE CLINIC | Facility: CLINIC | Age: 89
End: 2024-10-03
Payer: MEDICARE

## 2024-10-03 NOTE — TELEPHONE ENCOUNTER
----- Message from Isabella Miller MD sent at 10/3/2024  2:46 PM CDT -----  Speak to daughters. They were supposed to call them instead.     Isabella Miller MD  ----- Message -----  From: Dmitriy Wolfe MA  Sent: 10/3/2024   2:40 PM CDT  To: Isabella Miller MD    Refused pt  ----- Message -----  From: Delma Cramer  Sent: 10/3/2024   1:52 PM CDT  To: Angela Mason Staff    Who Called[facility]    Caller is requesting assistance/information from provider's office.    Symptoms (please be specific):   How long has patient had these symptoms:    List of preferred pharmacies on file (remove unneeded): [unfilled]  If different, enter pharmacy into here including location and phone number:       Patient's Preferred Phone Number on File: [phone]  Best Call Back Number, if different:  Additional Information:  Nurse called to report, pt refused home therapy , stated its not needed .

## 2024-10-04 ENCOUNTER — PATIENT MESSAGE (OUTPATIENT)
Dept: PRIMARY CARE CLINIC | Facility: CLINIC | Age: 89
End: 2024-10-04
Payer: MEDICARE

## 2024-10-07 ENCOUNTER — TELEPHONE (OUTPATIENT)
Dept: PRIMARY CARE CLINIC | Facility: CLINIC | Age: 89
End: 2024-10-07
Payer: MEDICARE

## 2024-10-09 ENCOUNTER — PATIENT MESSAGE (OUTPATIENT)
Dept: PRIMARY CARE CLINIC | Facility: CLINIC | Age: 89
End: 2024-10-09
Payer: MEDICARE

## 2024-10-09 NOTE — LETTER
October 22, 2024      [facility]  [address], [address]  [place] [address]  Phone: [phone]  Fax: [phone]       Patient: Shamika Pleitez   YOB: 1931    To Whom It May Concern:    Ms. Shamika Pleitez has been under my care since 2022. I recently prescribed Trimcinolone Acetonide .1%, Cetririzine 10 mg daily PRN, Hydroxyzine HCL 25mg PRN and Bactroban Ointment for an itchy rash. These medications are to be refilled on an as needed basis, only if the patient is complaining about itching and rash again.    If you have any questions or concerns, or if I can be of further assistance, please do not hesitate to contact me.    Sincerely,      Isabella Miller MD

## 2024-10-21 ENCOUNTER — TELEPHONE (OUTPATIENT)
Dept: PRIMARY CARE CLINIC | Facility: CLINIC | Age: 89
End: 2024-10-21
Payer: MEDICARE

## 2024-10-21 ENCOUNTER — PATIENT MESSAGE (OUTPATIENT)
Dept: CARDIOLOGY | Facility: CLINIC | Age: 89
End: 2024-10-21
Payer: MEDICARE

## 2024-10-21 ENCOUNTER — PATIENT MESSAGE (OUTPATIENT)
Dept: PRIMARY CARE CLINIC | Facility: CLINIC | Age: 89
End: 2024-10-21
Payer: MEDICARE

## 2024-10-21 DIAGNOSIS — R54 FRAIL ELDERLY: ICD-10-CM

## 2024-10-21 DIAGNOSIS — R29.6 FALLS FREQUENTLY: ICD-10-CM

## 2024-10-21 DIAGNOSIS — R26.81 GAIT INSTABILITY: Primary | ICD-10-CM

## 2024-10-21 NOTE — TELEPHONE ENCOUNTER
"----- Message from Hugo Dawn sent at 10/21/2024 10:41 AM CDT -----  Regarding: advice  Who Called: Petra with [facility]     Caller is requesting assistance/information from provider's office.    Symptoms (please be specific):    How long has patient had these symptoms:    List of preferred pharmacies on file (remove unneeded): [unfilled]  If different, enter pharmacy into here including location and phone number:       Preferred Method of Contact: Phone Call  Patient's Preferred Phone Number on File: [phone]   Best Call Back Number, if different: [phone]    Additional Information: Petra with [facility] called about referral. She stated she called pt's daughter to establish pt which daughter stated pt needs services but pt will refuse. Petra stated pt's daughter was requesting a mandatory order for pt so she could not refuse services. Please advise   [facility]" [phone]  "

## 2024-11-12 DIAGNOSIS — K59.00 CONSTIPATION, UNSPECIFIED CONSTIPATION TYPE: ICD-10-CM

## 2024-11-12 RX ORDER — DOCUSATE SODIUM 100 MG/1
CAPSULE, LIQUID FILLED ORAL
Qty: 30 CAPSULE | Refills: 11 | OUTPATIENT
Start: 2024-11-12

## 2024-11-12 RX ORDER — DOCUSATE SODIUM 100 MG/1
100 CAPSULE, LIQUID FILLED ORAL DAILY
Qty: 30 CAPSULE | Refills: 11 | Status: SHIPPED | OUTPATIENT
Start: 2024-11-12

## 2025-03-25 DIAGNOSIS — L30.9 DERMATITIS: ICD-10-CM

## 2025-03-25 RX ORDER — CETIRIZINE HYDROCHLORIDE 10 MG/1
10 TABLET ORAL DAILY
Qty: 90 TABLET | Refills: 11 | OUTPATIENT
Start: 2025-03-25

## 2025-03-25 RX ORDER — CETIRIZINE HYDROCHLORIDE 10 MG/1
10 TABLET ORAL DAILY
Qty: 90 TABLET | Refills: 3 | Status: SHIPPED | OUTPATIENT
Start: 2025-03-25 | End: 2026-03-25